# Patient Record
Sex: FEMALE | Race: OTHER | Employment: UNEMPLOYED | URBAN - METROPOLITAN AREA
[De-identification: names, ages, dates, MRNs, and addresses within clinical notes are randomized per-mention and may not be internally consistent; named-entity substitution may affect disease eponyms.]

---

## 2017-12-21 ENCOUNTER — OFFICE VISIT (OUTPATIENT)
Dept: OBGYN | Age: 57
End: 2017-12-21

## 2017-12-21 VITALS
BODY MASS INDEX: 25.27 KG/M2 | WEIGHT: 148 LBS | HEIGHT: 64 IN | DIASTOLIC BLOOD PRESSURE: 78 MMHG | SYSTOLIC BLOOD PRESSURE: 126 MMHG

## 2017-12-21 DIAGNOSIS — Z12.31 SCREENING MAMMOGRAM, ENCOUNTER FOR: ICD-10-CM

## 2017-12-21 DIAGNOSIS — Z01.419 WOMEN'S ANNUAL ROUTINE GYNECOLOGICAL EXAMINATION: Primary | ICD-10-CM

## 2017-12-21 DIAGNOSIS — Z11.51 SPECIAL SCREENING EXAMINATION FOR HUMAN PAPILLOMAVIRUS (HPV): ICD-10-CM

## 2017-12-21 PROCEDURE — 99396 PREV VISIT EST AGE 40-64: CPT | Performed by: OBSTETRICS & GYNECOLOGY

## 2017-12-21 RX ORDER — FAMOTIDINE 20 MG/1
TABLET, FILM COATED ORAL
Refills: 1 | COMMUNITY
Start: 2017-12-15

## 2017-12-21 RX ORDER — RIZATRIPTAN BENZOATE 10 MG/1
10 TABLET ORAL
COMMUNITY
End: 2019-01-08 | Stop reason: ALTCHOICE

## 2017-12-21 ASSESSMENT — ENCOUNTER SYMPTOMS
BLOOD IN STOOL: 0
ALLERGIC/IMMUNOLOGIC NEGATIVE: 1
CONSTIPATION: 0
NAUSEA: 0
EYES NEGATIVE: 1
ANAL BLEEDING: 0
ABDOMINAL PAIN: 0
RECTAL PAIN: 0
DIARRHEA: 0
ABDOMINAL DISTENTION: 0
VOMITING: 0
RESPIRATORY NEGATIVE: 1

## 2017-12-22 LAB — PAP SMEAR: NORMAL

## 2017-12-22 NOTE — PROGRESS NOTES
Subjective:      Patient ID: Isiah Melendez is a 61 y.o. female    Annual exam.  No PMB. No GYN complaints. Pap performed and screening mammogram ordered. Monthly SBE encouraged. Screening colonoscopy recommended per routine. F/U annual exam or prn. Vitals:  /78   Ht 5' 4\" (1.626 m)   Wt 148 lb (67.1 kg)   BMI 25.40 kg/m²   Past Medical History:   Diagnosis Date    IBS (irritable bowel syndrome)     Migraines      Past Surgical History:   Procedure Laterality Date    BUNIONECTOMY      NASAL SEPTUM SURGERY      TUBAL LIGATION      VARICOSE VEIN SURGERY       Allergies: Iodides; Nsaids; and Adhesive tape  Current Outpatient Prescriptions   Medication Sig Dispense Refill    famotidine (PEPCID) 20 MG tablet   1    rizatriptan (MAXALT) 10 MG tablet Take 10 mg by mouth once as needed for Migraine May repeat in 2 hours if needed       No current facility-administered medications for this visit. Social History     Social History    Marital status:      Spouse name: N/A    Number of children: N/A    Years of education: N/A     Occupational History    Not on file.      Social History Main Topics    Smoking status: Never Smoker    Smokeless tobacco: Never Used    Alcohol use No    Drug use: No    Sexual activity: Yes     Partners: Male     Other Topics Concern    Not on file     Social History Narrative    No narrative on file     Family History   Problem Relation Age of Onset    Alzheimer's Disease Father     Hypertension Mother     Diabetes Mother     Cancer Mother     No Known Problems Paternal Grandfather     No Known Problems Paternal Grandmother     No Known Problems Maternal Grandmother     No Known Problems Maternal Grandfather     No Known Problems Brother     No Known Problems Sister     No Known Problems Other     Breast Cancer Neg Hx     Colon Cancer Neg Hx     Eclampsia Neg Hx     Ovarian Cancer Neg Hx      Labor Neg Hx     Spont Abortions Neg Hx     Stroke Neg Hx        Review of Systems   Constitutional: Negative. Negative for activity change, appetite change, chills, diaphoresis, fatigue, fever and unexpected weight change. HENT: Negative. Eyes: Negative. Respiratory: Negative. Cardiovascular: Negative. Gastrointestinal: Negative for abdominal distention, abdominal pain, anal bleeding, blood in stool, constipation, diarrhea, nausea, rectal pain and vomiting. Endocrine: Negative. Genitourinary: Negative for decreased urine volume, difficulty urinating, dyspareunia, dysuria, enuresis, flank pain, frequency, genital sores, hematuria, menstrual problem, pelvic pain, urgency, vaginal bleeding, vaginal discharge and vaginal pain. Musculoskeletal: Negative. Skin: Negative. Allergic/Immunologic: Negative. Neurological: Negative. Hematological: Negative. Psychiatric/Behavioral: Negative. Objective:     Physical Exam   Constitutional: She is oriented to person, place, and time. She appears well-developed and well-nourished. HENT:   Head: Normocephalic. Neck: Normal range of motion. Neck supple. No thyromegaly present. Cardiovascular: Normal rate, regular rhythm and normal heart sounds. Pulmonary/Chest: Effort normal and breath sounds normal. No respiratory distress. She has no wheezes. She has no rales. She exhibits no tenderness. Abdominal: Soft. Bowel sounds are normal. She exhibits no distension and no mass. There is no tenderness. There is no rebound and no guarding. Hernia confirmed negative in the right inguinal area and confirmed negative in the left inguinal area. Genitourinary: Rectum normal, vagina normal and uterus normal. No breast swelling, tenderness, discharge or bleeding. No labial fusion. There is no rash, tenderness, lesion or injury on the right labia. There is no rash, tenderness, lesion or injury on the left labia. Uterus is not deviated, not enlarged, not fixed and not tender. Cervix exhibits no motion tenderness, no discharge and no friability. Right adnexum displays no mass, no tenderness and no fullness. Left adnexum displays no mass, no tenderness and no fullness. No erythema, tenderness or bleeding in the vagina. No foreign body in the vagina. No signs of injury around the vagina. No vaginal discharge found. Musculoskeletal: Normal range of motion. She exhibits no edema or tenderness. Lymphadenopathy:     She has no cervical adenopathy. Right: No inguinal adenopathy present. Left: No inguinal adenopathy present. Neurological: She is alert and oriented to person, place, and time. Skin: Skin is warm and dry. No rash noted. No erythema. No pallor. Psychiatric: She has a normal mood and affect. Her behavior is normal. Judgment and thought content normal.       Assessment:      1. Women's annual routine gynecological examination  PAP SMEAR   2. Special screening examination for human papillomavirus (HPV)  PAP SMEAR   3. Screening mammogram, encounter for  LOGAN DIGITAL SCREEN W OR WO CAD BILATERAL         Plan:      Medications placed this encounter:  No orders of the defined types were placed in this encounter. Orders placed this encounter:  Orders Placed This Encounter   Procedures    LOGAN DIGITAL SCREEN W OR WO CAD BILATERAL     Standing Status:   Future     Standing Expiration Date:   2/21/2019    PAP SMEAR     Standing Status:   Future     Standing Expiration Date:   12/21/2018     Order Specific Question:   Collection Type     Answer:   SurePath     Order Specific Question:   Prior Abnormal Pap Test     Answer:   No     Order Specific Question:   Screening or Diagnostic     Answer:   Screening     Order Specific Question:   HPV Requested?      Answer:   HPV 16/18     Order Specific Question:   High Risk Patient     Answer:   N/A         Follow up:  Return in about 1 year (around 12/21/2018) for Annual.

## 2018-01-03 DIAGNOSIS — Z11.51 SPECIAL SCREENING EXAMINATION FOR HUMAN PAPILLOMAVIRUS (HPV): ICD-10-CM

## 2018-01-03 DIAGNOSIS — Z01.419 WOMEN'S ANNUAL ROUTINE GYNECOLOGICAL EXAMINATION: ICD-10-CM

## 2018-01-03 NOTE — LETTER
[place] [id number] [facility] [address] [address]  Phone: [phone]  Fax: [phone]    Ted Holt MD        January 4, 2018    [address] [address]      Dear Sandra Boucher:    The results of your most recent Pap smear are normal. This means that no cancerous or precancerous cells were seen. We recommend that you come back in 1 year for your next routine Pap smear. If you have any questions or concerns, please don't hesitate to call.     Sincerely,        Ted Holt MD

## 2018-04-05 ENCOUNTER — HOSPITAL ENCOUNTER (OUTPATIENT)
Dept: ULTRASOUND IMAGING | Age: 58
Discharge: HOME | End: 2018-04-07
Payer: COMMERCIAL

## 2018-04-05 DIAGNOSIS — K85.00 IDIOPATHIC ACUTE PANCREATITIS, UNSPECIFIED COMPLICATION STATUS: ICD-10-CM

## 2018-04-05 PROCEDURE — 76705 ECHO EXAM OF ABDOMEN: CPT

## 2019-01-08 ENCOUNTER — OFFICE VISIT (OUTPATIENT)
Dept: OBGYN CLINIC | Age: 59
End: 2019-01-08
Payer: COMMERCIAL

## 2019-01-08 VITALS
HEIGHT: 64 IN | DIASTOLIC BLOOD PRESSURE: 72 MMHG | SYSTOLIC BLOOD PRESSURE: 110 MMHG | WEIGHT: 156 LBS | BODY MASS INDEX: 26.63 KG/M2

## 2019-01-08 DIAGNOSIS — Z01.419 ENCOUNTER FOR WELL WOMAN EXAM: Primary | ICD-10-CM

## 2019-01-08 DIAGNOSIS — Z12.31 SCREENING MAMMOGRAM, ENCOUNTER FOR: ICD-10-CM

## 2019-01-08 DIAGNOSIS — Z78.0 POSTMENOPAUSAL: ICD-10-CM

## 2019-01-08 PROCEDURE — 99396 PREV VISIT EST AGE 40-64: CPT | Performed by: OBSTETRICS & GYNECOLOGY

## 2019-01-08 PROCEDURE — G8484 FLU IMMUNIZE NO ADMIN: HCPCS | Performed by: OBSTETRICS & GYNECOLOGY

## 2019-01-08 RX ORDER — VITAMIN B COMPLEX
1 CAPSULE ORAL DAILY
COMMUNITY
End: 2020-06-11

## 2019-01-08 RX ORDER — ELETRIPTAN HYDROBROMIDE 40 MG/1
40 TABLET, FILM COATED ORAL
COMMUNITY

## 2019-01-08 RX ORDER — MAGNESIUM 30 MG
30 TABLET ORAL 2 TIMES DAILY
COMMUNITY

## 2019-01-08 RX ORDER — ATENOLOL 50 MG/1
TABLET ORAL
Refills: 0 | COMMUNITY
Start: 2018-10-26

## 2019-01-08 ASSESSMENT — ENCOUNTER SYMPTOMS
RESPIRATORY NEGATIVE: 1
ABDOMINAL PAIN: 0
ABDOMINAL DISTENTION: 0
ANAL BLEEDING: 0
RECTAL PAIN: 0
CONSTIPATION: 0
VOMITING: 0
DIARRHEA: 0
NAUSEA: 0
BLOOD IN STOOL: 0
ALLERGIC/IMMUNOLOGIC NEGATIVE: 1
EYES NEGATIVE: 1

## 2019-01-08 ASSESSMENT — PATIENT HEALTH QUESTIONNAIRE - PHQ9
SUM OF ALL RESPONSES TO PHQ QUESTIONS 1-9: 0
2. FEELING DOWN, DEPRESSED OR HOPELESS: 0
SUM OF ALL RESPONSES TO PHQ QUESTIONS 1-9: 0
SUM OF ALL RESPONSES TO PHQ9 QUESTIONS 1 & 2: 0
1. LITTLE INTEREST OR PLEASURE IN DOING THINGS: 0

## 2019-11-13 ENCOUNTER — OFFICE VISIT (OUTPATIENT)
Dept: FAMILY MEDICINE CLINIC | Age: 59
End: 2019-11-13
Payer: COMMERCIAL

## 2019-11-13 VITALS
WEIGHT: 149.8 LBS | BODY MASS INDEX: 25.57 KG/M2 | OXYGEN SATURATION: 98 % | HEART RATE: 68 BPM | HEIGHT: 64 IN | RESPIRATION RATE: 16 BRPM | SYSTOLIC BLOOD PRESSURE: 126 MMHG | DIASTOLIC BLOOD PRESSURE: 68 MMHG | TEMPERATURE: 98.9 F

## 2019-11-13 DIAGNOSIS — N61.1 BREAST ABSCESS OF FEMALE: Primary | ICD-10-CM

## 2019-11-13 PROCEDURE — 99203 OFFICE O/P NEW LOW 30 MIN: CPT | Performed by: NURSE PRACTITIONER

## 2019-11-13 RX ORDER — PREDNISOLONE SODIUM PHOSPHATE 15 MG/5ML
60 SOLUTION ORAL
COMMUNITY
Start: 2017-04-03 | End: 2021-06-15

## 2019-11-13 RX ORDER — RIZATRIPTAN BENZOATE 10 MG/1
TABLET ORAL
COMMUNITY

## 2019-11-13 RX ORDER — LORATADINE 10 MG/1
10 TABLET ORAL
COMMUNITY
End: 2021-06-15

## 2019-11-13 RX ORDER — SULFAMETHOXAZOLE AND TRIMETHOPRIM 800; 160 MG/1; MG/1
1 TABLET ORAL 2 TIMES DAILY
Qty: 14 TABLET | Refills: 0 | Status: SHIPPED | OUTPATIENT
Start: 2019-11-13 | End: 2019-11-20

## 2019-11-13 RX ORDER — METHOCARBAMOL 750 MG/1
TABLET, FILM COATED ORAL
Refills: 0 | COMMUNITY
Start: 2019-10-31 | End: 2020-06-11

## 2019-11-28 PROBLEM — N61.1 BREAST ABSCESS OF FEMALE: Status: ACTIVE | Noted: 2019-11-28

## 2019-11-28 PROBLEM — I10 ESSENTIAL HYPERTENSION: Status: ACTIVE | Noted: 2019-11-28

## 2020-06-11 ENCOUNTER — OFFICE VISIT (OUTPATIENT)
Dept: OBGYN CLINIC | Age: 60
End: 2020-06-11
Payer: COMMERCIAL

## 2020-06-11 VITALS
SYSTOLIC BLOOD PRESSURE: 110 MMHG | BODY MASS INDEX: 26.98 KG/M2 | DIASTOLIC BLOOD PRESSURE: 70 MMHG | WEIGHT: 158 LBS | HEIGHT: 64 IN

## 2020-06-11 PROCEDURE — G8419 CALC BMI OUT NRM PARAM NOF/U: HCPCS | Performed by: OBSTETRICS & GYNECOLOGY

## 2020-06-11 PROCEDURE — 99396 PREV VISIT EST AGE 40-64: CPT | Performed by: OBSTETRICS & GYNECOLOGY

## 2020-06-11 PROCEDURE — 3017F COLORECTAL CA SCREEN DOC REV: CPT | Performed by: OBSTETRICS & GYNECOLOGY

## 2020-06-11 PROCEDURE — 1036F TOBACCO NON-USER: CPT | Performed by: OBSTETRICS & GYNECOLOGY

## 2020-06-11 PROCEDURE — G8427 DOCREV CUR MEDS BY ELIG CLIN: HCPCS | Performed by: OBSTETRICS & GYNECOLOGY

## 2020-06-11 PROCEDURE — 99213 OFFICE O/P EST LOW 20 MIN: CPT | Performed by: OBSTETRICS & GYNECOLOGY

## 2020-06-11 RX ORDER — CLOBETASOL PROPIONATE 0.5 MG/G
0.5 OINTMENT TOPICAL
Qty: 30 G | Refills: 2 | Status: SHIPPED | OUTPATIENT
Start: 2020-06-11 | End: 2021-06-15 | Stop reason: SDUPTHER

## 2020-06-11 ASSESSMENT — ENCOUNTER SYMPTOMS
RECTAL PAIN: 0
ABDOMINAL PAIN: 0
ANAL BLEEDING: 0
NAUSEA: 0
ALLERGIC/IMMUNOLOGIC NEGATIVE: 1
ABDOMINAL DISTENTION: 0
RESPIRATORY NEGATIVE: 1
CONSTIPATION: 0
VOMITING: 0
BLOOD IN STOOL: 0
EYES NEGATIVE: 1
DIARRHEA: 0

## 2020-06-11 NOTE — PROGRESS NOTES
Eclampsia Neg Hx     Ovarian Cancer Neg Hx      Labor Neg Hx     Spont Abortions Neg Hx     Stroke Neg Hx        Review of Systems   Constitutional: Negative. Negative for activity change, appetite change, chills, diaphoresis, fatigue, fever and unexpected weight change. HENT: Negative. Eyes: Negative. Respiratory: Negative. Cardiovascular: Negative. Gastrointestinal: Negative for abdominal distention, abdominal pain, anal bleeding, blood in stool, constipation, diarrhea, nausea, rectal pain and vomiting. Endocrine: Negative. Genitourinary: Negative for decreased urine volume, difficulty urinating, dyspareunia, dysuria, enuresis, flank pain, frequency, genital sores, hematuria, menstrual problem, pelvic pain, urgency, vaginal bleeding, vaginal discharge and vaginal pain. Musculoskeletal: Negative. Skin: Negative. Allergic/Immunologic: Negative. Neurological: Negative. Hematological: Negative. Psychiatric/Behavioral: Negative. Objective:     Physical Exam  Constitutional:       Appearance: She is well-developed. HENT:      Head: Normocephalic. Neck:      Musculoskeletal: Normal range of motion and neck supple. Thyroid: No thyromegaly. Cardiovascular:      Rate and Rhythm: Normal rate and regular rhythm. Heart sounds: Normal heart sounds. Pulmonary:      Effort: Pulmonary effort is normal. No respiratory distress. Breath sounds: Normal breath sounds. No wheezing or rales. Chest:      Chest wall: No tenderness. Breasts:         Right: No mass, nipple discharge, skin change or tenderness. Left: No mass, nipple discharge, skin change or tenderness. Abdominal:      General: Bowel sounds are normal. There is no distension. Palpations: Abdomen is soft. There is no mass. Tenderness: There is no abdominal tenderness. There is no guarding or rebound.       Hernia: There is no hernia in the right inguinal area or left

## 2020-06-12 DIAGNOSIS — Z01.419 ENCOUNTER FOR ANNUAL ROUTINE GYNECOLOGICAL EXAMINATION: ICD-10-CM

## 2020-06-12 DIAGNOSIS — Z11.51 SCREENING FOR HUMAN PAPILLOMAVIRUS: ICD-10-CM

## 2020-06-16 ENCOUNTER — HOSPITAL ENCOUNTER (OUTPATIENT)
Dept: WOMENS IMAGING | Age: 60
Discharge: HOME | End: 2020-06-18
Payer: COMMERCIAL

## 2020-06-16 PROCEDURE — 77080 DXA BONE DENSITY AXIAL: CPT

## 2020-06-17 LAB
HPV COMMENT: NORMAL
HPV TYPE 16: NOT DETECTED
HPV TYPE 18: NOT DETECTED
HPVOH (OTHER TYPES): NOT DETECTED

## 2020-07-14 ENCOUNTER — TELEPHONE (OUTPATIENT)
Dept: OBGYN CLINIC | Age: 60
End: 2020-07-14

## 2020-07-14 PROBLEM — M47.816 LUMBAR SPONDYLOSIS: Status: ACTIVE | Noted: 2020-07-14

## 2020-07-14 PROBLEM — M46.1 SACROILIITIS (HCC): Status: ACTIVE | Noted: 2020-07-14

## 2020-07-14 PROBLEM — Z98.890 HISTORY OF FOOT SURGERY: Status: ACTIVE | Noted: 2020-07-14

## 2020-07-17 ENCOUNTER — INITIAL CONSULT (OUTPATIENT)
Dept: PODIATRY | Facility: CLINIC | Age: 60
End: 2020-07-17

## 2020-07-17 VITALS — TEMPERATURE: 97.1 F | WEIGHT: 150 LBS | BODY MASS INDEX: 25.61 KG/M2 | HEIGHT: 64 IN

## 2020-07-17 ASSESSMENT — ENCOUNTER SYMPTOMS
NAUSEA: 0
BACK PAIN: 1
CONSTIPATION: 0
DIARRHEA: 0
SHORTNESS OF BREATH: 0

## 2020-07-17 NOTE — PATIENT INSTRUCTIONS
Patient Education        Achilles Tendon: Exercises  Introduction  Here are some examples of exercises for you to try. The exercises may be suggested for a condition or for rehabilitation. Start each exercise slowly. Ease off the exercises if you start to have pain. You will be told when to start these exercises and which ones will work best for you. Toe stretch  Toe stretch   1. Sit in a chair, and extend your affected leg so that your heel is on the floor. 2. With your hand, reach down and pull your big toe up and back. Pull toward your ankle and away from the floor. 3. Hold the position for at least 15 to 30 seconds. 4. Repeat 2 to 4 times a session, several times a day. Calf-plantar fascia stretch   1. Sit with your legs extended and knees straight. 2. Place a towel around your foot just under the toes. 3. Hold each end of the towel in each hand, with your hands above your knees. 4. Pull back with the towel so that your foot stretches toward you. 5. Hold the position for at least 15 to 30 seconds. 6. Repeat 2 to 4 times a session, up to 5 sessions a day. Floor stretch   1. Stand about 2 feet from a wall, and place your hands on the wall at about shoulder height. Or you can stand behind a chair, placing your hands on the back of it for balance. 2. Step back with the leg you want to stretch. Keep the leg straight, and press your heel into the floor with your toe turned slightly in.  3. Lean forward, and bend your other leg slightly. Feel the stretch in the Achilles tendon of your back leg. Hold for at least 15 to 30 seconds. 4. Repeat 2 to 4 times a session, up to 5 sessions a day. Stair stretch   1. Stand with the balls of both feet on the edge of a step or curb (or a medium-sized phone book). With at least one hand, hold onto something solid for balance, such as a banister or handrail.   2. Keeping your affected leg straight, slowly let that heel hang down off of the step or curb until you feel a stretch in the back of your calf and/or Achilles area. Some of your weight should still be on the other leg. 3. Hold this position for at least 15 to 30 seconds. 4. Repeat 2 to 4 times a session, up to 5 times a day or whenever your Achilles tendon starts to feel tight. This stretch can also be done with your knee slightly bent. Strength exercise   1. This exercise will get you started on building strength after an Achilles tendon injury. Your doctor or physical therapist can help you move on to more challenging exercises as you heal and get stronger. 2. Stand on a step with your heel off the edge of the step. Hold on to a handrail or wall for balance. 3. Push up on your toes, then slowly count to 10 as you lower yourself back down until your heel is below the step. If it hurts to push up on your toes, try putting most of your weight on your other foot as you push up, or try using your arms to help you. If you can't do this exercise without causing pain, stop the exercise and talk to your doctor. 4. Repeat the exercise 8 to 12 times, half with the knee straight and half with the knee bent. Follow-up care is a key part of your treatment and safety. Be sure to make and go to all appointments, and call your doctor if you are having problems. It's also a good idea to know your test results and keep a list of the medicines you take. Where can you learn more? Go to https://[employer] org and sign in to your [employer] account. Enter S268 in the [facility] box to learn more about \"Achilles Tendon: Exercises. \"     If you do not have an account, please click on the \"Sign Up Now\" link. Current as of: March 2, 2020               Content Version: 12.5  © 9607-1772 Healthwise, Inc[employer]. Care instructions adapted under license by [facility] [employer] [facility]  If you have questions about a medical condition or this instruction, always ask your healthcare professional. Anibal Arellano

## 2020-07-17 NOTE — PROGRESS NOTES
Hadley Edmonds  (1960)    7/17/20    Subjective     Hadley Edmonds is 63 y.o. female who complains today of:    Chief Complaint   Patient presents with    Foot Pain      Left Heel      Hadley Edmonds is seen in consultation at the request of Dr. Anson Wilson for evaluation of left foot pain. HPI: The patient complains of left foot pain for the past 5 years. Patient describes the pain as achy, shooting, numb, tingling, and burning. She rates the pain at 6/10 at its worst.  Patient does not recall trauma. Patient points to the ball of her foot at the base of the second and third toes and the posterior heel is the most painful areas. Patient states that she had a bunion procedure performed in 2013 to address this pain, it was not effective. Patient states she was diagnosed with a Neal's neuroma, an injection was performed but it offered no relief. Patient states an ultrasound performed at that time, she states there was a collection of fluid in the area. She denies having x-rays performed recently. Review of Systems   Constitutional: Negative for fever. HENT: Negative for hearing loss. Respiratory: Negative for shortness of breath. Gastrointestinal: Negative for constipation, diarrhea and nausea. Genitourinary: Negative for difficulty urinating. Musculoskeletal: Positive for back pain. Negative for neck pain. Skin: Negative for rash. Neurological: Positive for headaches. Hematological: Does not bruise/bleed easily. Psychiatric/Behavioral: Negative for sleep disturbance. She has not tried physical therapy. Date of last of last PT treatment: none    The patient is not a diabetic. Allergies: Iodides; Nsaids; Adhesive tape; and Other    Current Outpatient Medications on File Prior to Visit   Medication Sig Dispense Refill    clobetasol (TEMOVATE) 0.05 % ointment Apply 0.5 each topically Twice a Week Apply topically 2 times daily.  30 g 2    rizatriptan (MAXALT) 10 MG tablet       Polyethylene Glycol 3350 (MIRALAX PO)       loratadine (CLARITIN) 10 MG tablet Take 10 mg by mouth      prednisoLONE (ORAPRED) 15 MG/5ML solution Take 60 mg by mouth      Riboflavin 100 MG TABS Take by mouth      atenolol (TENORMIN) 50 MG tablet take 1 tablet by mouth once daily  0    magnesium 30 MG tablet Take 30 mg by mouth 2 times daily      Coenzyme Q10 (CO Q-10 MAXIMUM STRENGTH PO) Take by mouth      eletriptan (RELPAX) 40 MG tablet Take 40 mg by mouth once as needed may repeat in 2 hours if necessary      famotidine (PEPCID) 20 MG tablet   1     No current facility-administered medications on file prior to visit.         Past Medical History:   Diagnosis Date    Breast abscess of female 11/28/2019    Essential hypertension 11/28/2019    IBS (irritable bowel syndrome)     Migraines     PVD (posterior vitreous detachment), right      Past Surgical History:   Procedure Laterality Date   [facility] EYE SURGERY  11/2018    NASAL SEPTUM SURGERY      TUBAL LIGATION      VARICOSE VEIN SURGERY       Social History     Socioeconomic History    Marital status:      Spouse name: Not on file    Number of children: Not on file    Years of education: Not on file    Highest education level: Not on file   Occupational History    Not on file   Social Needs    Financial resource strain: Not on file    Food insecurity     Worry: Not on file     Inability: Not on file    Transportation needs     Medical: Not on file     Non-medical: Not on file   Tobacco Use    Smoking status: Never Smoker    Smokeless tobacco: Never Used   Substance and Sexual Activity    Alcohol use: No     Alcohol/week: 0.0 standard drinks    Drug use: No    Sexual activity: Yes     Partners: Male   Lifestyle    Physical activity     Days per week: Not on file     Minutes per session: Not on file    Stress: Not on file   Relationships    Social connections     Talks on phone: Not on file     Gets bilaterally. Vibratory sensation is intact bilaterally. Hot versus cold discrimination is intact bilaterally. Sharp versus dull discrimination is intact bilaterally. Patellar deep tendon reflex is graded at 2/4 bilaterally. Achilles tendon deep tendon reflex is graded at 2/4 bilaterally. The Babinski response is negative bilaterally. No ankle clonus is noted bilaterally. The patient is able to abduct the bilateral fifth toe on command. There is pain and recreation of their symptoms without a palpable click with Marielos`s test and palpation of the second interspace left foot. Dermatological:  No open lesions noted bilateral foot. Surgical cicatrices noted to the bilateral first ray. The toenails are incurvated and are well groomed bilaterally. Normal skin texture noted bilaterally. Focal hyperkeratotic lesions noted: none. Normal skin turgor noted bilaterally. Webspace 1-4 is dry and intact bilateral foot. Erythema noted overlying the bilateral 5th MPJ. Musculoskeletal:  Rectus foot type noted bilaterally. Manual muscle strength is graded at 5/5 for all groups tested bilateral foot. Gross static deformities noted: tailor's bunion deformity with adductovarus fifth digit noted bilaterally. Pain or crepitus noted with active or passive range of motion of the joints of the foot or ankle: none. Decreased ankle joint dorsiflexion noted bilateral lower extremity. There is tenderness palpation of the Achilles tendon insertion at the medial/posterior aspect of the left heel, no palpable dell or appreciable rupture noted. No tenderness to palpation noted to the left second or third MPJ. Psychiatric:    Judgement and insight intact. Short and long term memory intact. No evidence of depression, anxiety, or agitation. Patient is calm, cooperative, and communicative. Appropriate interactions and affect. Assessment:      Diagnosis Orders   1.  Left foot pain  XR Foot Left Ap Lateral and Oblique Standing    WI STRAPPING; ANKLE &/OR FOOT   2. Lesion of left plantar nerve  WI STRAPPING; ANKLE &/OR FOOT   3. Tendonitis, Achilles, left         Plan:     Left foot neuroma: The etiology of the condition was discussed with the patient along with the various conservative treatment options available including steroid injection, sclerosing alcohol injection, and orthotic therapy. If these measures fail to relieve the symptoms surgical intervention may be warranted. A strapping and pad was constructed for the symptomatic area. The patient is to ice to the affected area for 10-15 minutess and perform stretching exercises daily. Wider shoe gear is also reccommended. X-rays of the left foot were ordered. Left Achilles tendonitis:  A home exercise plan was dispensed to the patient, she is instructed to apply heat to the calf muscle prior to stretching and to ice after. Orders Placed This Encounter   Procedures    XR Foot Left Ap Lateral and Oblique Standing     Standing Status:   Future     Standing Expiration Date:   7/17/2021     Order Specific Question:   Reason for exam:     Answer:   heel pain, forefoot pain    WI STRAPPING; ANKLE &/OR FOOT     All questions were answered to the patient's satisfaction. Thank you for allowing me to participate in the care of your patient. Follow up:  Return in about 1 week (around 7/24/2020). Linda Greer DPM      Please note that this report has been partially produced using speech recognition software which may cause errors including grammar, punctuation, and spelling or words and phrases that may seem inappropriate. If there are questions or concerns please feel free to contact me for clarification.

## 2020-07-27 ENCOUNTER — OFFICE VISIT (OUTPATIENT)
Dept: PODIATRY | Facility: CLINIC | Age: 60
End: 2020-07-27

## 2020-07-27 VITALS — TEMPERATURE: 97.7 F | HEIGHT: 64 IN | WEIGHT: 150 LBS | BODY MASS INDEX: 25.61 KG/M2

## 2020-07-27 ASSESSMENT — ENCOUNTER SYMPTOMS
BACK PAIN: 1
SHORTNESS OF BREATH: 0
CONSTIPATION: 0
NAUSEA: 0
DIARRHEA: 0

## 2020-07-27 NOTE — PROGRESS NOTES
Belvin Apley  (1960)    7/17/20    Subjective     Belvin Apley is 63 y.o. female who complains today of:    Chief Complaint   Patient presents with    Foot Pain     Left     Foot Pain    Pertinent negatives include no fever. HPI: The patient presents for follow up for left foot pain. She reports decreased pain when wearing the foot strapping. She reports compliance with home exercises. She states that she had x-rays of the left foot taken. Review of Systems   Constitutional: Negative for fever. HENT: Negative for hearing loss. Respiratory: Negative for shortness of breath. Gastrointestinal: Negative for constipation, diarrhea and nausea. Genitourinary: Negative for difficulty urinating. Musculoskeletal: Positive for back pain. Negative for neck pain. Skin: Negative for rash. Neurological: Positive for headaches. Hematological: Does not bruise/bleed easily. Psychiatric/Behavioral: Negative for sleep disturbance. She has not tried physical therapy. Date of last of last PT treatment: none    The patient is not a diabetic. Allergies: Iodides; Nsaids; Adhesive tape; and Other    Current Outpatient Medications on File Prior to Visit   Medication Sig Dispense Refill    clobetasol (TEMOVATE) 0.05 % ointment Apply 0.5 each topically Twice a Week Apply topically 2 times daily.  30 g 2    rizatriptan (MAXALT) 10 MG tablet       Polyethylene Glycol 3350 (MIRALAX PO)       loratadine (CLARITIN) 10 MG tablet Take 10 mg by mouth      prednisoLONE (ORAPRED) 15 MG/5ML solution Take 60 mg by mouth      Riboflavin 100 MG TABS Take by mouth      atenolol (TENORMIN) 50 MG tablet take 1 tablet by mouth once daily  0    magnesium 30 MG tablet Take 30 mg by mouth 2 times daily      Coenzyme Q10 (CO Q-10 MAXIMUM STRENGTH PO) Take by mouth      eletriptan (RELPAX) 40 MG tablet Take 40 mg by mouth once as needed may repeat in 2 hours if necessary      famotidine (PEPCID) 20 Father     Hypertension Mother     Diabetes Mother     Cancer Mother     No Known Problems Paternal Grandfather     No Known Problems Paternal Grandmother     No Known Problems Maternal Grandmother     No Known Problems Maternal Grandfather     No Known Problems Brother     No Known Problems Sister     No Known Problems Other     Breast Cancer Neg Hx     Colon Cancer Neg Hx     Eclampsia Neg Hx     Ovarian Cancer Neg Hx      Labor Neg Hx     Spont Abortions Neg Hx     Stroke Neg Hx            Objective:   Vitals:  Temp 97.7 °F (36.5 °C)   Ht 5' 4\" (1.626 m)   Wt 150 lb (68 kg)   BMI 25.75 kg/m² Pain Score:   4    Physical Exam  Vascular:   Dorsalis pedis pulse is palpable bilateral foot. Posterior tibial pulse is palpable bilateral foot. There is no edema noted to the bilateral lower extremity. Capillary refill is immediate bilateral hallux. There are mild varicosities noted bilaterally. There are mild telangiectasia noted bilaterally. Skin temperature gradient is noted to be warm to warm bilaterally. There are no signs of ischemia or skin atrophy noted bilaterally. Hair growth is present bilaterally. Neurological:   Light touch and protective sensation is intact bilaterally. Vibratory sensation is intact bilaterally. Hot versus cold discrimination is intact bilaterally. Sharp versus dull discrimination is intact bilaterally. Patellar deep tendon reflex is graded at 2/4 bilaterally. Achilles tendon deep tendon reflex is graded at 2/4 bilaterally. The Babinski response is negative bilaterally. No ankle clonus is noted bilaterally. The patient is able to abduct the bilateral fifth toe on command. There is pain and recreation of their symptoms without a palpable click with Marielos`s test and palpation of the second interspace left foot. Dermatological:  No open lesions noted bilateral foot. Surgical cicatrices noted to the bilateral first ray.   The toenails are incurvated and are well groomed bilaterally. Normal skin texture noted bilaterally. No focal hyperkeratotic lesions noted. Normal skin turgor noted bilaterally. Webspace 1-4 is dry and intact bilateral foot. Erythema noted overlying the bilateral 5th MPJ. Musculoskeletal:  Rectus foot type noted bilaterally. Manual muscle strength is graded at 5/5 for all groups tested bilateral foot. Gross static deformities noted: tailor's bunion deformity with adductovarus fifth digit noted bilaterally. Pain or crepitus noted with active or passive range of motion of the joints of the foot or ankle: none. Decreased ankle joint dorsiflexion noted bilateral lower extremity. There is tenderness palpation of the Achilles tendon insertion at the medial/posterior aspect of the left heel, no palpable dell or appreciable rupture noted. No tenderness to palpation noted to the left second or third metatarsal heads. Radiographs:                    Assessment:      Diagnosis Orders   1. Left foot pain  US EXTREMITY JOINT LEFT NON VASC COMPLETE   2. Neal's neuroma of second interspace of left foot  US EXTREMITY JOINT LEFT NON VASC COMPLETE   3. Tendonitis, Achilles, left  US EXTREMITY JOINT LEFT NON VASC COMPLETE   4. Hammer toe of left foot         Plan:     Left foot x-rays reviewed. I have ordered a diagnostic ultrasound was ordered. Patient instructed to continue foot strapping and home exercise plan.     I have recommended a toe juan toe separator and a topical pain cream.    Orders Placed This Encounter   Procedures    US EXTREMITY JOINT LEFT NON VASC COMPLETE     Standing Status:   Future     Standing Expiration Date:   7/27/2021     Scheduling Instructions:      Include symptoms in field above \"REASON FOR EXAM\"      Order: US Complete Left Ankle/Foot     Order Specific Question:   Reason for exam:     Answer:   pain 2nd interspace and Achiiles insertion, also check plantar plates 2 & 3     Follow up:  Return in about 3 weeks (around 8/17/2020). John Wallis DPM      Please note that this report has been partially produced using speech recognition software which may cause errors including grammar, punctuation, and spelling or words and phrases that may seem inappropriate. If there are questions or concerns please feel free to contact me for clarification.

## 2020-08-10 ENCOUNTER — OFFICE VISIT (OUTPATIENT)
Dept: OBGYN CLINIC | Age: 60
End: 2020-08-10
Payer: COMMERCIAL

## 2020-08-10 VITALS — BODY MASS INDEX: 27.81 KG/M2 | DIASTOLIC BLOOD PRESSURE: 78 MMHG | SYSTOLIC BLOOD PRESSURE: 132 MMHG | WEIGHT: 162 LBS

## 2020-08-10 PROCEDURE — 99213 OFFICE O/P EST LOW 20 MIN: CPT | Performed by: OBSTETRICS & GYNECOLOGY

## 2020-08-10 PROCEDURE — 1036F TOBACCO NON-USER: CPT | Performed by: OBSTETRICS & GYNECOLOGY

## 2020-08-10 PROCEDURE — G8419 CALC BMI OUT NRM PARAM NOF/U: HCPCS | Performed by: OBSTETRICS & GYNECOLOGY

## 2020-08-10 PROCEDURE — 3017F COLORECTAL CA SCREEN DOC REV: CPT | Performed by: OBSTETRICS & GYNECOLOGY

## 2020-08-10 PROCEDURE — G8427 DOCREV CUR MEDS BY ELIG CLIN: HCPCS | Performed by: OBSTETRICS & GYNECOLOGY

## 2020-08-10 RX ORDER — IBANDRONATE SODIUM 150 MG/1
150 TABLET, FILM COATED ORAL
Qty: 30 TABLET | Refills: 3 | Status: SHIPPED | OUTPATIENT
Start: 2020-08-10 | End: 2021-08-10

## 2020-08-10 ASSESSMENT — ENCOUNTER SYMPTOMS
RECTAL PAIN: 0
CONSTIPATION: 0
ABDOMINAL DISTENTION: 0
NAUSEA: 0
EYES NEGATIVE: 1
BLOOD IN STOOL: 0
DIARRHEA: 0
RESPIRATORY NEGATIVE: 1
ANAL BLEEDING: 0
ALLERGIC/IMMUNOLOGIC NEGATIVE: 1
ABDOMINAL PAIN: 0
VOMITING: 0

## 2020-08-10 NOTE — PROGRESS NOTES
Subjective:      Patient ID: Trina Hernanedz is a 63 y.o. female    Pt here to discuss Dexa results. T score -2.6. Discussed osteopenia and risks of spontaneous fracture from osteopenia/osteoprorosis. Discussed prevention measures including Vitamin D and Calcium supplements, weight bearing exercise and meds with risks and benefits. Pt has opted to start Boniva as directed. F/U annual exam or prn. Vitals:  /78   Wt 162 lb (73.5 kg)   BMI 27.81 kg/m²   Past Medical History:   Diagnosis Date    Breast abscess of female 11/28/2019    Essential hypertension 11/28/2019    IBS (irritable bowel syndrome)     Migraines     PVD (posterior vitreous detachment), right      Past Surgical History:   Procedure Laterality Date   Kurt Large EYE SURGERY  11/2018    NASAL SEPTUM SURGERY      TUBAL LIGATION      VARICOSE VEIN SURGERY       Allergies: Iodides; Nsaids; Adhesive tape; and Other  Current Outpatient Medications   Medication Sig Dispense Refill    clobetasol (TEMOVATE) 0.05 % ointment Apply 0.5 each topically Twice a Week Apply topically 2 times daily. 30 g 2    rizatriptan (MAXALT) 10 MG tablet       Polyethylene Glycol 3350 (MIRALAX PO)       loratadine (CLARITIN) 10 MG tablet Take 10 mg by mouth      prednisoLONE (ORAPRED) 15 MG/5ML solution Take 60 mg by mouth      Riboflavin 100 MG TABS Take by mouth      atenolol (TENORMIN) 50 MG tablet take 1 tablet by mouth once daily  0    magnesium 30 MG tablet Take 30 mg by mouth 2 times daily      Coenzyme Q10 (CO Q-10 MAXIMUM STRENGTH PO) Take by mouth      eletriptan (RELPAX) 40 MG tablet Take 40 mg by mouth once as needed may repeat in 2 hours if necessary      famotidine (PEPCID) 20 MG tablet   1     No current facility-administered medications for this visit.       Social History     Socioeconomic History    Marital status:      Spouse name: Not on file    Number of children: Not on file    Years of education: Not on unexpected weight change. HENT: Negative. Eyes: Negative. Respiratory: Negative. Cardiovascular: Negative. Gastrointestinal: Negative for abdominal distention, abdominal pain, anal bleeding, blood in stool, constipation, diarrhea, nausea, rectal pain and vomiting. Endocrine: Negative. Genitourinary: Negative for decreased urine volume, difficulty urinating, dyspareunia, dysuria, enuresis, flank pain, frequency, genital sores, hematuria, menstrual problem, pelvic pain, urgency, vaginal bleeding, vaginal discharge and vaginal pain. Musculoskeletal: Negative. Skin: Negative. Allergic/Immunologic: Negative. Neurological: Negative. Hematological: Negative. Psychiatric/Behavioral: Negative. Objective:     Physical Exam  Constitutional:       General: She is not in acute distress. Appearance: She is well-developed. She is not diaphoretic. HENT:      Head: Normocephalic and atraumatic. Eyes:      Conjunctiva/sclera: Conjunctivae normal.   Neck:      Musculoskeletal: Normal range of motion and neck supple. Cardiovascular:      Rate and Rhythm: Normal rate and regular rhythm. Pulmonary:      Effort: Pulmonary effort is normal. No respiratory distress. Musculoskeletal: Normal range of motion. General: No tenderness or deformity. Skin:     General: Skin is warm and dry. Coloration: Skin is not pale. Neurological:      Mental Status: She is alert and oriented to person, place, and time. Motor: No abnormal muscle tone. Coordination: Coordination normal.   Psychiatric:         Behavior: Behavior normal.         Thought Content: Thought content normal.         Judgment: Judgment normal.         Assessment:       Diagnosis Orders   1. Follow up     2.  Osteoporosis, unspecified osteoporosis type, unspecified pathological fracture presence          Plan:      Medications placedthis encounter:  No orders of the defined types were placed in this encounter. Orders placedthis encounter:  No orders of the defined types were placed in this encounter.         Follow up:  Return for Annual.

## 2020-12-08 PROBLEM — M54.17 LUMBOSACRAL RADICULOPATHY: Status: ACTIVE | Noted: 2020-12-08

## 2020-12-08 PROBLEM — M51.26 LUMBAR DISC HERNIATION: Status: ACTIVE | Noted: 2020-12-08

## 2020-12-08 PROBLEM — Z87.19 H/O: GI BLEED: Status: ACTIVE | Noted: 2020-12-08

## 2021-02-01 ENCOUNTER — OFFICE VISIT (OUTPATIENT)
Dept: PODIATRY | Facility: CLINIC | Age: 61
End: 2021-02-01

## 2021-02-01 VITALS — WEIGHT: 160 LBS | BODY MASS INDEX: 27.31 KG/M2 | HEIGHT: 64 IN | TEMPERATURE: 96.9 F

## 2021-02-01 DIAGNOSIS — S84.12XA COMMON PERONEAL NERVE DYSFUNCTION OF LEFT LOWER EXTREMITY, INITIAL ENCOUNTER: ICD-10-CM

## 2021-02-01 DIAGNOSIS — S93.492A SPRAIN OF ANTERIOR TALOFIBULAR LIGAMENT OF LEFT ANKLE, INITIAL ENCOUNTER: ICD-10-CM

## 2021-02-01 DIAGNOSIS — M25.572 ACUTE LEFT ANKLE PAIN: Primary | ICD-10-CM

## 2021-02-01 ASSESSMENT — ENCOUNTER SYMPTOMS
DIARRHEA: 0
BACK PAIN: 1
CONSTIPATION: 0
NAUSEA: 0
SHORTNESS OF BREATH: 0

## 2021-02-01 NOTE — PROGRESS NOTES
Pb Sheppard  (1960)    02/1/2021    Subjective     Pb Sheppard is 63 y.o. female who complains today of:    Chief Complaint   Patient presents with    Ankle Pain     Left     HPI: The patient presents with complaint of left ankle pain. She describes the pain as sharp and tingling. Patient states that she notices late last night and the pain was worse this morning. Patient does not recall trauma that she did observe swelling to the lateral ankle, she has a photograph on her phone showing edema to the lateral left ankle. Patient describes having cramping to the left leg like she was about to have a \"charley horse\" last night. Patient states that she is scheduled to undergo an EMG in 2 days. Patient states that she has not observed any redness or bruising to the ankle. Foot Pain   Pertinent negatives include no fever. Review of Systems   Constitutional: Negative for fever. HENT: Negative for hearing loss. Respiratory: Negative for shortness of breath. Gastrointestinal: Negative for constipation, diarrhea and nausea. Genitourinary: Negative for difficulty urinating. Musculoskeletal: Positive for back pain. Negative for neck pain. Skin: Negative for rash. Neurological: Positive for headaches. Hematological: Does not bruise/bleed easily. Psychiatric/Behavioral: Negative for sleep disturbance. She has tried physical therapy. Date of last of last PT treatment: January 2021 with [facility]    The patient is not a diabetic. Allergies: Iodides, Nsaids, Adhesive tape, and Other    Current Outpatient Medications on File Prior to Visit   Medication Sig Dispense Refill    lidocaine (LIDODERM) 5 % Place 1 patch onto the skin daily 12 hours on, 12 hours off.  30 patch 0  ibandronate (BONIVA) 150 MG tablet Take 1 tablet by mouth every 30 days Take one (1) tablet once per month in the morning with a full glass of water, on an empty stomach, and do not take anything else by mouth or lie down for the next 30 minutes. 30 tablet 3    clobetasol (TEMOVATE) 0.05 % ointment Apply 0.5 each topically Twice a Week Apply topically 2 times daily. 30 g 2    rizatriptan (MAXALT) 10 MG tablet       Polyethylene Glycol 3350 (MIRALAX PO)       loratadine (CLARITIN) 10 MG tablet Take 10 mg by mouth      prednisoLONE (ORAPRED) 15 MG/5ML solution Take 60 mg by mouth      Riboflavin 100 MG TABS Take by mouth      atenolol (TENORMIN) 50 MG tablet take 1 tablet by mouth once daily  0    magnesium 30 MG tablet Take 30 mg by mouth 2 times daily      Coenzyme Q10 (CO Q-10 MAXIMUM STRENGTH PO) Take by mouth      eletriptan (RELPAX) 40 MG tablet Take 40 mg by mouth once as needed may repeat in 2 hours if necessary      famotidine (PEPCID) 20 MG tablet   1     No current facility-administered medications on file prior to visit.         Past Medical History:   Diagnosis Date    Breast abscess of female 11/28/2019    Essential hypertension 11/28/2019    IBS (irritable bowel syndrome)     Migraines     PVD (posterior vitreous detachment), right      Past Surgical History:   Procedure Laterality Date   Shaune Amas EYE SURGERY  11/2018    NASAL SEPTUM SURGERY      TUBAL LIGATION      VARICOSE VEIN SURGERY       Social History     Socioeconomic History    Marital status:      Spouse name: Not on file    Number of children: Not on file    Years of education: Not on file    Highest education level: Not on file   Occupational History    Not on file   Social Needs    Financial resource strain: Not on file    Food insecurity     Worry: Not on file     Inability: Not on file    Transportation needs     Medical: Not on file     Non-medical: Not on file   Tobacco Use  Smoking status: Never Smoker    Smokeless tobacco: Never Used   Substance and Sexual Activity    Alcohol use: No     Alcohol/week: 0.0 standard drinks    Drug use: No    Sexual activity: Yes     Partners: Male   Lifestyle    Physical activity     Days per week: Not on file     Minutes per session: Not on file    Stress: Not on file   Relationships    Social connections     Talks on phone: Not on file     Gets together: Not on file     Attends Alevism service: Not on file     Active member of club or organization: Not on file     Attends meetings of clubs or organizations: Not on file     Relationship status: Not on file    Intimate partner violence     Fear of current or ex partner: Not on file     Emotionally abused: Not on file     Physically abused: Not on file     Forced sexual activity: Not on file   Other Topics Concern    Not on file   Social History Narrative    Not on file     Family History   Problem Relation Age of Onset    Alzheimer's Disease Father     Hypertension Mother     Diabetes Mother     Cancer Mother     No Known Problems Paternal Grandfather     No Known Problems Paternal Grandmother     No Known Problems Maternal Grandmother     No Known Problems Maternal Grandfather     No Known Problems Brother     No Known Problems Sister     No Known Problems Other     Breast Cancer Neg Hx     Colon Cancer Neg Hx     Eclampsia Neg Hx     Ovarian Cancer Neg Hx      Labor Neg Hx     Spont Abortions Neg Hx     Stroke Neg Hx            Objective:   Vitals:  Temp 96.9 °F (36.1 °C)   Ht 5' 4\" (1.626 m)   Wt 160 lb (72.6 kg)   BMI 27.46 kg/m² Pain Score:   7    Physical Exam  Vascular:   Dorsalis pedis pulse is palpable bilateral foot. Posterior tibial pulse is palpable bilateral foot. There is focal nonpitiing edema noted to the lateral left ankle. Capillary refill is immediate bilateral hallux. There are mild varicosities noted bilaterally. There are mild telangiectasia noted bilaterally. Skin temperature gradient is noted to be warm to warm bilaterally. There are no signs of ischemia or skin atrophy noted bilaterally. Hair growth is present bilaterally. Neurological:   Light touch and protective sensation is intact bilaterally. Vibratory sensation is intact bilaterally. Hot versus cold discrimination is intact bilaterally. Sharp versus dull discrimination is intact bilaterally. Patellar deep tendon reflex is graded at 2/4 bilaterally. Achilles tendon deep tendon reflex is graded at 2/4 bilaterally. The Babinski response is negative bilaterally. No ankle clonus is noted bilaterally. Positive Tinel sign (radiating pain) elicited with percussion of the left common peroneal nerve distal to the fibular head. Dermatological:  No open lesions noted bilateral foot. Surgical cicatrices noted to the bilateral first ray. The toenails are incurvated and are well groomed bilaterally. Normal skin texture noted bilaterally. No focal hyperkeratotic lesions noted. Normal skin turgor noted bilaterally. Webspace 1-4 is dry and intact bilateral foot. Erythema noted overlying the bilateral 5th MPJ. Musculoskeletal:  Rectus foot type noted bilaterally. Manual muscle strength is graded at 5/5 for all groups tested bilateral foot. Gross static deformities noted: tailor's bunion deformity with adductovarus fifth digit noted bilaterally. Pain or crepitus noted with active or passive range of motion of the joints of the foot or ankle: left ankle. Decreased ankle joint dorsiflexion noted bilateral lower extremity. There is tenderness palpation at the left lateral ankle anterior talofibular ligament, no ecchymosis or erythema noted. Anterior drawer, external rotation, and distal squeeze exams are within normal limits. No gross deformity noted with talar tilt. No pain noted to the base of the fifth metatarsal or the Achilles tendon insertion left lower extremity. Assessment:      Diagnosis Orders   1. Acute left ankle pain  SD AFO ANKLE GAUNTLET PRE OTS    SD STRAPPING; ANKLE &/OR FOOT   2. Sprain of anterior talofibular ligament of left ankle, initial encounter  SD AFO ANKLE GAUNTLET PRE OTS    SD STRAPPING; ANKLE &/OR FOOT   3. Common peroneal nerve dysfunction of left lower extremity, initial encounter         Plan:     Left leg neuritis: patient instructed to apply topical lidocaine to the common peroneal nerve at the lateral left knee, she was specifically told not to apply it Tuesday night prior to her upcoming EMG exam.    Left ATFL sprain: A compression wrap and ankle strapping was applied. Patient started to apply an ice pack to the painful area for 10 minutes twice a day. I have submitted an order for an ASO/ankle brace. Patient will need the device for 3-6 months depending on her recovery. Patient is to wear at all times ambulating. Patient will require the  device to stabilize the left ankle to help prevent further injury and to allow her to perform ADL's with decreased discomfort. Orders Placed This Encounter   Procedures    SD AFO ANKLE GAUNTLET PRE OTS     Standing Status:   Future     Standing Expiration Date:   5/1/2021    SD STRAPPING; ANKLE &/OR FOOT     Follow up:  Return in about 3 weeks (around 2/22/2021). Maykel Barbosa DPM      Total time spent in patient care: 26 minutes. Level of medical decision making: low. Please note that this report has been partially produced using speech recognition software which may cause errors including grammar, punctuation, and spelling or words and phrases that may seem inappropriate. If there are questions or concerns please feel free to contact me for clarification.

## 2021-02-18 ENCOUNTER — HOSPITAL ENCOUNTER (OUTPATIENT)
Dept: MRI IMAGING | Age: 61
Discharge: HOME | End: 2021-02-20
Payer: COMMERCIAL

## 2021-02-18 ENCOUNTER — HOSPITAL ENCOUNTER (OUTPATIENT)
Dept: GENERAL RADIOLOGY | Age: 61
Discharge: HOME | End: 2021-02-20
Payer: COMMERCIAL

## 2021-02-18 DIAGNOSIS — M54.16 LUMBAR RADICULOPATHY: ICD-10-CM

## 2021-02-18 DIAGNOSIS — M79.605 LEFT LEG PAIN: ICD-10-CM

## 2021-02-18 PROCEDURE — 72148 MRI LUMBAR SPINE W/O DYE: CPT

## 2021-02-18 PROCEDURE — 72110 X-RAY EXAM L-2 SPINE 4/>VWS: CPT

## 2021-02-22 ENCOUNTER — OFFICE VISIT (OUTPATIENT)
Dept: PODIATRY | Facility: CLINIC | Age: 61
End: 2021-02-22

## 2021-02-22 VITALS — TEMPERATURE: 97.1 F | WEIGHT: 160 LBS | BODY MASS INDEX: 27.31 KG/M2 | HEIGHT: 64 IN

## 2021-02-22 DIAGNOSIS — M25.572 ACUTE LEFT ANKLE PAIN: ICD-10-CM

## 2021-02-22 DIAGNOSIS — G57.92 NEURITIS OF LEFT FOOT: ICD-10-CM

## 2021-02-22 DIAGNOSIS — M76.72 PERONEAL TENDINITIS, LEFT: ICD-10-CM

## 2021-02-22 DIAGNOSIS — S93.492A SPRAIN OF ANTERIOR TALOFIBULAR LIGAMENT OF LEFT ANKLE, INITIAL ENCOUNTER: ICD-10-CM

## 2021-02-22 DIAGNOSIS — M79.672 LEFT FOOT PAIN: Primary | ICD-10-CM

## 2021-02-22 ASSESSMENT — ENCOUNTER SYMPTOMS
SHORTNESS OF BREATH: 0
DIARRHEA: 0
BACK PAIN: 1
NAUSEA: 0
CONSTIPATION: 0

## 2021-02-22 NOTE — PATIENT INSTRUCTIONS
Patient Education        Ankle Sprain: Rehab Exercises  Introduction  Here are some examples of exercises for you to try. The exercises may be suggested for a condition or for rehabilitation. Start each exercise slowly. Ease off the exercises if you start to have pain. You will be told when to start these exercises and which ones will work best for you. How to do the exercises  \"Alphabet\" exercise   1. Trace the alphabet with your toe. This helps your ankle move in all directions. Side-to-side knee swing exercise   1. Sit in a chair with your foot flat on the floor. 2. Slowly move your knee from side to side. Keep your foot pressed flat. 3. Continue this exercise for 2 to 3 minutes. Towel curl   1. While sitting, place your foot on a towel on the floor. Scrunch the towel toward you with your toes. 2. Then use your toes to push the towel away from you. 3. To make this exercise more challenging you can put something on the other end of the towel. A can of soup is about the right weight for this. Towel stretch   1. Sit with your legs extended and knees straight. 2. Place a towel around your foot just under the toes. 3. Hold each end of the towel in each hand, with your hands above your knees. 4. Pull back with the towel so that your foot stretches toward you. 5. Hold the position for at least 15 to 30 seconds. 6. Repeat 2 to 4 times a session. Do up to 5 sessions a day. Ankle eversion exercise   1. Start by sitting with your foot flat on the floor. Push your foot outward against a wall or a piece of furniture that doesn't move. Hold for about 6 seconds, and relax. Repeat 8 to 12 times. 2. After you feel comfortable with this, try using rubber tubing looped around the outside of your feet for resistance. Push your foot out to the side against the tubing, and then count to 10 as you slowly bring your foot back to the middle. Repeat 8 to 12 times.     Isometric opposition exercises 1. While sitting, put your feet together flat on the floor. 2. Press your injured foot inward against your other foot. Hold for about 6 seconds, and relax. Repeat 8 to 12 times. 3. Then place the heel of your other foot on top of the injured one. Push down with the top heel while trying to push up with your injured foot. Hold for about 6 seconds, and relax. Repeat 8 to 12 times. Resisted ankle inversion   1. Sit on the floor with your good leg crossed over your other leg. 2. Hold both ends of an exercise band and loop the band around the inside of your affected foot. Then press your other foot against the band. 3. Keeping your legs crossed, slowly push your affected foot against the band so that foot moves away from your other foot. Then slowly relax. 4. Repeat 8 to 12 times. Resisted ankle eversion   1. Sit on the floor with your legs straight. 2. Hold both ends of an exercise band and loop the band around the outside of your affected foot. Then press your other foot against the band. 3. Keeping your leg straight, slowly push your affected foot outward against the band and away from your other foot without letting your leg rotate. Then slowly relax. 4. Repeat 8 to 12 times. Resisted ankle dorsiflexion   1. Tie the ends of an exercise band together to form a loop. Attach one end of the loop to a secure object or shut a door on it to hold it in place. (Or you can have someone hold one end of the loop to provide resistance.)  2. While sitting on the floor or in a chair, loop the other end of the band over the top of your affected foot. 3. Keeping your knee and leg straight, slowly flex your foot to pull back on the exercise band, and then slowly relax. 4. Repeat 8 to 12 times.     Single-leg balance 1. Stand on a flat surface with your arms stretched out to your sides like you are making the letter \"T. \" Then lift your good leg off the floor, bending it at the knee. If you are not steady on your feet, use one hand to hold on to a chair, counter, or wall. 2. Standing on the leg with your affected ankle, keep that knee straight. Try to balance on that leg for up to 30 seconds. Then rest for up to 10 seconds. 3. Repeat 6 to 8 times. 4. When you can balance on your affected leg for 30 seconds with your eyes open, try to balance on it with your eyes closed. 5. When you can do this exercise with your eyes closed for 30 seconds and with ease and no pain, try standing on a pillow or piece of foam, and repeat steps 1 through 4. Follow-up care is a key part of your treatment and safety. Be sure to make and go to all appointments, and call your doctor if you are having problems. It's also a good idea to know your test results and keep a list of the medicines you take. Where can you learn more? Go to https://[employer]. org and sign in to your [employer] account. Enter Tatiana Hernandez in the [facility] box to learn more about \"Ankle Sprain: Rehab Exercises. \"     If you do not have an account, please click on the \"Sign Up Now\" link. Current as of: March 2, 2020               Content Version: 12.6  © 2214-9457 [employer]. Care instructions adapted under license by [facility] ([facility]). If you have questions about a medical condition or this instruction, always ask your healthcare professional. [place] [address] any warranty or liability for your use of this information.          Patient Education

## 2021-02-22 NOTE — PROGRESS NOTES
Alphonse Khanness  (1960)    02/22/2021     Subjective     Alphonse Hernandez is 63 y.o. female who complains today of:    Chief Complaint   Patient presents with    Ankle Pain     left ankle     HPI: The patient presents for follow up for a left foot and ankle injury. She denies significant improvement. She has not been able to wear the ASO on a regular basis due to weakness of her thumbs making applying it difficult. Patient reports that she had an EMG performed since her last visit, it was negative. She reports continued tingling and burning sensation to the left heel. She also continues to have pain at the ball of the foot. Foot Pain   Pertinent negatives include no fever. Ankle Pain         Review of Systems   Constitutional: Negative for fever. HENT: Negative for hearing loss. Respiratory: Negative for shortness of breath. Gastrointestinal: Negative for constipation, diarrhea and nausea. Genitourinary: Negative for difficulty urinating. Musculoskeletal: Positive for back pain. Negative for neck pain. Skin: Negative for rash. Neurological: Positive for headaches. Hematological: Does not bruise/bleed easily. Psychiatric/Behavioral: Negative for sleep disturbance. She has tried physical therapy. Date of last of last PT treatment: January 2021 with [facility]    The patient is not a diabetic. Allergies: Iodides, Nsaids, Adhesive tape, and Other    Current Outpatient Medications on File Prior to Visit   Medication Sig Dispense Refill    lidocaine (LIDODERM) 5 % Place 1 patch onto the skin daily 12 hours on, 12 hours off. 30 patch 0    ibandronate (BONIVA) 150 MG tablet Take 1 tablet by mouth every 30 days Take one (1) tablet once per month in the morning with a full glass of water, on an empty stomach, and do not take anything else by mouth or lie down for the next 30 minutes.  30 tablet 3    clobetasol (TEMOVATE) 0.05 % ointment Apply 0.5 each topically Twice a Week Apply topically 2 times daily. 30 g 2    rizatriptan (MAXALT) 10 MG tablet       Polyethylene Glycol 3350 (MIRALAX PO)       loratadine (CLARITIN) 10 MG tablet Take 10 mg by mouth      prednisoLONE (ORAPRED) 15 MG/5ML solution Take 60 mg by mouth      Riboflavin 100 MG TABS Take by mouth      atenolol (TENORMIN) 50 MG tablet take 1 tablet by mouth once daily  0    magnesium 30 MG tablet Take 30 mg by mouth 2 times daily      Coenzyme Q10 (CO Q-10 MAXIMUM STRENGTH PO) Take by mouth      eletriptan (RELPAX) 40 MG tablet Take 40 mg by mouth once as needed may repeat in 2 hours if necessary      famotidine (PEPCID) 20 MG tablet   1     No current facility-administered medications on file prior to visit.         Past Medical History:   Diagnosis Date    Breast abscess of female 11/28/2019    Essential hypertension 11/28/2019    IBS (irritable bowel syndrome)     Migraines     PVD (posterior vitreous detachment), right      Past Surgical History:   Procedure Laterality Date   [facility] EYE SURGERY  11/2018    NASAL SEPTUM SURGERY      TUBAL LIGATION      VARICOSE VEIN SURGERY       Social History     Socioeconomic History    Marital status:      Spouse name: Not on file    Number of children: Not on file    Years of education: Not on file    Highest education level: Not on file   Occupational History    Not on file   Social Needs    Financial resource strain: Not on file    Food insecurity     Worry: Not on file     Inability: Not on file    Transportation needs     Medical: Not on file     Non-medical: Not on file   Tobacco Use    Smoking status: Never Smoker    Smokeless tobacco: Never Used   Substance and Sexual Activity    Alcohol use: No     Alcohol/week: 0.0 standard drinks    Drug use: No    Sexual activity: Yes     Partners: Male   Lifestyle    Physical activity     Days per week: Not on file     Minutes per session: Not on file    Stress: Not on file plan dispensed to the patient. I have reccommended that she place toe crest pad on the left 2nd toe to decrease pressure on the forefoot. A repeat x-ray of the left foot ordered to evaluate the calcaneus, a lucency was noted on prior imaging which may be the apophysis suture, will evaluate for sclerosis or bony callus formation consistent with stress fracture. Orders Placed This Encounter   Procedures    XR FOOT LEFT (MIN 3 VIEWS)     Standing Status:   Future     Standing Expiration Date:   2/22/2022     Order Specific Question:   Reason for exam:     Answer:   heel pain, injury     Follow up:  Return in about 2 weeks (around 3/8/2021). Gilma Atkins DPM      Total time spent in patient care: 26 minutes. Level of medical decision making: low. Please note that this report has been partially produced using speech recognition software which may cause errors including grammar, punctuation, and spelling or words and phrases that may seem inappropriate. If there are questions or concerns please feel free to contact me for clarification.

## 2021-02-25 DIAGNOSIS — M79.672 LEFT FOOT PAIN: ICD-10-CM

## 2021-02-25 PROBLEM — M43.17 ACQUIRED SPONDYLOLISTHESIS OF LUMBOSACRAL REGION: Status: ACTIVE | Noted: 2021-02-25

## 2021-03-08 ENCOUNTER — OFFICE VISIT (OUTPATIENT)
Dept: PODIATRY | Facility: CLINIC | Age: 61
End: 2021-03-08

## 2021-03-08 VITALS — WEIGHT: 160 LBS | BODY MASS INDEX: 27.31 KG/M2 | HEIGHT: 64 IN | TEMPERATURE: 97.1 F

## 2021-03-08 DIAGNOSIS — G57.92 NEURITIS OF LEFT FOOT: ICD-10-CM

## 2021-03-08 DIAGNOSIS — S93.492D SPRAIN OF ANTERIOR TALOFIBULAR LIGAMENT OF LEFT ANKLE, SUBSEQUENT ENCOUNTER: ICD-10-CM

## 2021-03-08 DIAGNOSIS — M20.5X2 ACQUIRED HALLUX LIMITUS OF LEFT FOOT: ICD-10-CM

## 2021-03-08 DIAGNOSIS — M20.5X1 HALLUX LIMITUS, ACQUIRED, RIGHT: ICD-10-CM

## 2021-03-08 DIAGNOSIS — M21.621 TAILOR'S BUNIONETTE, BILATERAL: ICD-10-CM

## 2021-03-08 DIAGNOSIS — M76.72 PERONEAL TENDINITIS, LEFT: ICD-10-CM

## 2021-03-08 DIAGNOSIS — M79.672 LEFT FOOT PAIN: Primary | ICD-10-CM

## 2021-03-08 DIAGNOSIS — M21.622 TAILOR'S BUNIONETTE, BILATERAL: ICD-10-CM

## 2021-03-08 DIAGNOSIS — G57.62 LESION OF LEFT PLANTAR NERVE: ICD-10-CM

## 2021-03-08 ASSESSMENT — ENCOUNTER SYMPTOMS
SHORTNESS OF BREATH: 0
DIARRHEA: 0
NAUSEA: 0
BACK PAIN: 1
CONSTIPATION: 0

## 2021-03-08 NOTE — PROGRESS NOTES
Pb Sheppard  (1960)    03/08/2021     Subjective     Pb Sheppard is 63 y.o. female who complains today of:    Chief Complaint   Patient presents with    Foot Pain     Left     HPI: The patient presents for follow up for a left foot and ankle pain. She states that she is considering proceeding with back surgery. She continues to have burning and tingling pain in the left heel and forefoot. She reports decreased pain to the left 2nd toe with use of padding. She had x-rays taken since her last visit. She reports decreased left ankle pain, she has been able to wear the ASO brace on a more consistent basis. Ankle Pain     Foot Pain   Pertinent negatives include no fever. Review of Systems   Constitutional: Negative for fever. HENT: Negative for hearing loss. Respiratory: Negative for shortness of breath. Gastrointestinal: Negative for constipation, diarrhea and nausea. Genitourinary: Negative for difficulty urinating. Musculoskeletal: Positive for back pain. Negative for neck pain. Skin: Negative for rash. Neurological: Positive for headaches. Hematological: Does not bruise/bleed easily. Psychiatric/Behavioral: Negative for sleep disturbance. She has tried physical therapy. Date of last of last PT treatment: January 2021 with [facility]    The patient is not a diabetic. Allergies: Iodides, Nsaids, Adhesive tape, and Other    Current Outpatient Medications on File Prior to Visit   Medication Sig Dispense Refill    tiZANidine (ZANAFLEX) 4 MG tablet Take 1 tablet by mouth 3 times daily as needed (pain and spasm) 90 tablet 2    ibandronate (BONIVA) 150 MG tablet Take 1 tablet by mouth every 30 days Take one (1) tablet once per month in the morning with a full glass of water, on an empty stomach, and do not take anything else by mouth or lie down for the next 30 minutes.  30 tablet 3    clobetasol (TEMOVATE) 0.05 % ointment Apply 0.5 each topically Twice a Week Apply topically 2 times daily. 30 g 2    rizatriptan (MAXALT) 10 MG tablet       Polyethylene Glycol 3350 (MIRALAX PO)       loratadine (CLARITIN) 10 MG tablet Take 10 mg by mouth      prednisoLONE (ORAPRED) 15 MG/5ML solution Take 60 mg by mouth      Riboflavin 100 MG TABS Take by mouth      atenolol (TENORMIN) 50 MG tablet take 1 tablet by mouth once daily  0    magnesium 30 MG tablet Take 30 mg by mouth 2 times daily      Coenzyme Q10 (CO Q-10 MAXIMUM STRENGTH PO) Take by mouth      eletriptan (RELPAX) 40 MG tablet Take 40 mg by mouth once as needed may repeat in 2 hours if necessary      famotidine (PEPCID) 20 MG tablet   1     No current facility-administered medications on file prior to visit.         Past Medical History:   Diagnosis Date    Breast abscess of female 11/28/2019    Essential hypertension 11/28/2019    IBS (irritable bowel syndrome)     Migraines     PVD (posterior vitreous detachment), right      Past Surgical History:   Procedure Laterality Date   [facility]YE SURGERY  11/2018    NASAL SEPTUM SURGERY      TUBAL LIGATION      VARICOSE VEIN SURGERY       Social History     Socioeconomic History    Marital status:      Spouse name: Not on file    Number of children: Not on file    Years of education: Not on file    Highest education level: Not on file   Occupational History    Not on file   Social Needs    Financial resource strain: Not on file    Food insecurity     Worry: Not on file     Inability: Not on file    Transportation needs     Medical: Not on file     Non-medical: Not on file   Tobacco Use    Smoking status: Never Smoker    Smokeless tobacco: Never Used   Substance and Sexual Activity    Alcohol use: No     Alcohol/week: 0.0 standard drinks    Drug use: No    Sexual activity: Yes     Partners: Male   Lifestyle    Physical activity     Days per week: Not on file     Minutes per session: Not on file    Stress: Not on file   Relationships    Social connections     Talks on phone: Not on file     Gets together: Not on file     Attends Gnosticist service: Not on file     Active member of club or organization: Not on file     Attends meetings of clubs or organizations: Not on file     Relationship status: Not on file    Intimate partner violence     Fear of current or ex partner: Not on file     Emotionally abused: Not on file     Physically abused: Not on file     Forced sexual activity: Not on file   Other Topics Concern    Not on file   Social History Narrative    Not on file     Family History   Problem Relation Age of Onset    Alzheimer's Disease Father     Hypertension Mother     Diabetes Mother     Cancer Mother     No Known Problems Paternal Grandfather     No Known Problems Paternal Grandmother     No Known Problems Maternal Grandmother     No Known Problems Maternal Grandfather     No Known Problems Brother     No Known Problems Sister     No Known Problems Other     Breast Cancer Neg Hx     Colon Cancer Neg Hx     Eclampsia Neg Hx     Ovarian Cancer Neg Hx      Labor Neg Hx     Spont Abortions Neg Hx     Stroke Neg Hx            Objective:   Vitals:  Temp 97.1 °F (36.2 °C)   Ht 5' 4\" (1.626 m)   Wt 160 lb (72.6 kg)   BMI 27.46 kg/m² Pain Score:   2    Physical Exam  Vascular:   Dorsalis pedis pulse is palpable bilateral foot. Posterior tibial pulse is palpable bilateral foot. There is trace nonpitiing edema noted to the lateral left ankle. Capillary refill is immediate bilateral hallux. There are mild varicosities noted bilaterally. There are mild telangiectasia noted bilaterally. Skin temperature gradient is noted to be warm to warm bilaterally. There are no signs of ischemia or skin atrophy noted bilaterally. Hair growth is present bilaterally. Neurological:   Light touch and protective sensation is intact bilaterally. Vibratory sensation is intact bilaterally.   Hot versus cold discrimination is intact bilaterally. Sharp versus dull discrimination is intact bilaterally. Patellar deep tendon reflex is graded at 2/4 bilaterally. Achilles tendon deep tendon reflex is graded at 2/4 bilaterally. The Babinski response is negative bilaterally. No ankle clonus is noted bilaterally. Positive Tinel sign (radiating pain) elicited with percussion of the left common peroneal nerve distal to the fibular head. Dermatological:  No open lesions noted bilateral foot. Surgical cicatrices noted to the bilateral first ray. The toenails are incurvated and are well groomed bilaterally. Normal skin texture noted bilaterally. No focal hyperkeratotic lesions noted. Normal skin turgor noted bilaterally. Webspace 1-4 is dry and intact bilateral foot. Erythema noted overlying the bilateral 5th MPJ. Musculoskeletal:  Rectus foot type noted bilaterally. Manual muscle strength is graded at 5/5 for all groups tested bilateral foot. There is pain with resisted eversion of the left foot, there is tenderness to palpation of the base of the 5th metatarsal.  Tailor's bunion deformity with adductovarus fifth digit noted bilaterally. Pain without crepitus noted with range of motion of the left ankle joint. Decreased ankle joint dorsiflexion noted bilateral lower extremity. There is tenderness palpation at the left lateral ankle anterior talofibular ligament, no ecchymosis or erythema noted. There is decreased dorsiflexion available to the bilateral 1st MPJ with simulated weightbearing. Assessment:      Diagnosis Orders   1. Left foot pain     2. Neuritis of left foot     3. Lesion of left plantar nerve     4. Tailor's bunionette, bilateral  OH FT INSERT UCB RANJITH SHELL   5. Acquired hallux limitus of left foot  OH FT INSERT UCB RANJITH SHELL   6. Hallux limitus, acquired, right  OH FT INSERT UCB RANJITH SHELL   7. Peroneal tendinitis, left     8.  Sprain of anterior talofibular ligament of left ankle, subsequent encounter         Plan:     Left ATFL sprain/ peroneal tendonitis: Patient instructed to continue use of ASO ankle brace and supportive shoes to protect the tendon/ ligament. Bilateral foot deformities: I have recommended functional custom orthotics. Neuritis left foot: Patient instructed to continue posterior muscle groups stretching to  pressure to the forefoot. Await her decision on spinal procedure. I will add an accommodative top cover to the orthotics with a metatarsal pad to offload the forefoot. Orders Placed This Encounter   Procedures    OH FT INSERT UCB RANJITH SHELL     Prescriptioin: Qty 2     Standing Status:   Future     Standing Expiration Date:   2021     Follow up:  Return for orthotic casting. Andres oRbles DPM      Total time spent in patient care: 20 minutes. Level of medical decision making: low. Please note that this report has been partially produced using speech recognition software which may cause errors including grammar, punctuation, and spelling or words and phrases that may seem inappropriate. If there are questions or concerns please feel free to contact me for clarification.

## 2021-06-15 ENCOUNTER — OFFICE VISIT (OUTPATIENT)
Dept: OBGYN CLINIC | Age: 61
End: 2021-06-15
Payer: COMMERCIAL

## 2021-06-15 VITALS
DIASTOLIC BLOOD PRESSURE: 86 MMHG | HEIGHT: 64 IN | WEIGHT: 166 LBS | BODY MASS INDEX: 28.34 KG/M2 | SYSTOLIC BLOOD PRESSURE: 134 MMHG

## 2021-06-15 DIAGNOSIS — Z01.419 ENCOUNTER FOR WELL WOMAN EXAM WITH ROUTINE GYNECOLOGICAL EXAM: Primary | ICD-10-CM

## 2021-06-15 DIAGNOSIS — Z12.31 SCREENING MAMMOGRAM, ENCOUNTER FOR: ICD-10-CM

## 2021-06-15 PROCEDURE — 99396 PREV VISIT EST AGE 40-64: CPT | Performed by: OBSTETRICS & GYNECOLOGY

## 2021-06-15 RX ORDER — CLOBETASOL PROPIONATE 0.5 MG/G
0.5 OINTMENT TOPICAL
Qty: 30 G | Refills: 3 | Status: SHIPPED | OUTPATIENT
Start: 2021-06-17 | End: 2022-03-11 | Stop reason: SDUPTHER

## 2021-06-15 RX ORDER — CETIRIZINE HYDROCHLORIDE 10 MG/1
10 TABLET ORAL DAILY
COMMUNITY

## 2021-06-15 ASSESSMENT — ENCOUNTER SYMPTOMS
RESPIRATORY NEGATIVE: 1
ALLERGIC/IMMUNOLOGIC NEGATIVE: 1
NAUSEA: 0
ABDOMINAL DISTENTION: 0
BLOOD IN STOOL: 0
VOMITING: 0
CONSTIPATION: 0
ANAL BLEEDING: 0
ABDOMINAL PAIN: 0
EYES NEGATIVE: 1
RECTAL PAIN: 0
DIARRHEA: 0

## 2021-06-15 ASSESSMENT — PATIENT HEALTH QUESTIONNAIRE - PHQ9
SUM OF ALL RESPONSES TO PHQ QUESTIONS 1-9: 0
SUM OF ALL RESPONSES TO PHQ QUESTIONS 1-9: 0
2. FEELING DOWN, DEPRESSED OR HOPELESS: 0
SUM OF ALL RESPONSES TO PHQ QUESTIONS 1-9: 0
1. LITTLE INTEREST OR PLEASURE IN DOING THINGS: 0
SUM OF ALL RESPONSES TO PHQ9 QUESTIONS 1 & 2: 0

## 2021-06-15 ASSESSMENT — VISUAL ACUITY: OU: 1

## 2021-06-15 NOTE — PROGRESS NOTES
vulva.      Pubic Area: No rash. Labia:         Right: No rash, tenderness, lesion or injury. Left: No rash, tenderness, lesion or injury. Urethra: No prolapse, urethral swelling or urethral lesion. Vagina: Normal. No signs of injury and foreign body. No vaginal discharge, erythema, tenderness or bleeding. Cervix: No cervical motion tenderness, discharge or friability. Uterus: Not deviated, not enlarged, not fixed and not tender. Adnexa:         Right: No mass, tenderness or fullness. Left: No mass, tenderness or fullness. Rectum: Normal.   Musculoskeletal:         General: No tenderness. Normal range of motion. Cervical back: Normal range of motion and neck supple. Lymphadenopathy:      Cervical: No cervical adenopathy. Upper Body:      Right upper body: No supraclavicular or axillary adenopathy. Left upper body: No supraclavicular or axillary adenopathy. Lower Body: No right inguinal adenopathy. No left inguinal adenopathy. Skin:     General: Skin is warm and dry. Coloration: Skin is not pale. Findings: No erythema or rash. Neurological:      Mental Status: She is alert and oriented to person, place, and time. Psychiatric:         Behavior: Behavior normal.         Thought Content: Thought content normal.         Judgment: Judgment normal.         Assessment:       Diagnosis Orders   1. Encounter for well woman exam with routine gynecological exam     2. Screening mammogram, encounter for          Plan:      Medications placedthis encounter:  Orders Placed This Encounter   Medications    clobetasol (TEMOVATE) 0.05 % ointment     Sig: Apply 0.5 each topically Twice a Week Apply topically 2 times daily. Dispense:  30 g     Refill:  3         Orders placedthis encounter:  No orders of the defined types were placed in this encounter.         Follow up:  Return in about 1 year (around 6/15/2022) for Annual.   Repeat Annual GYN exam every 1 year. Cervical Cytology exam starts age 24. If Negative Cytology;  follow-up screening per current guidelines. Mammograms yearly starting at age 36. Calcium and Vitamin D dosing reviewed ( age appropriate ). Colonoscopy and bone density screening discussed ( age appropriate ). Birth control and STD prevention discussed ( age appropriate ). Gardisil counseling completed for all patients ( age appropriate ). Routine health maintenance ( per PCP and guidelines ). The patient was asked if she would like a chaperone present for her intimate exam. She  Declined the chaperone.  Vanessa Boateng MD

## 2021-08-10 DIAGNOSIS — M81.0 OSTEOPOROSIS, UNSPECIFIED OSTEOPOROSIS TYPE, UNSPECIFIED PATHOLOGICAL FRACTURE PRESENCE: ICD-10-CM

## 2021-08-10 RX ORDER — IBANDRONATE SODIUM 150 MG/1
TABLET, FILM COATED ORAL
Qty: 30 TABLET | Refills: 3 | Status: SHIPPED | OUTPATIENT
Start: 2021-08-10

## 2022-03-11 ENCOUNTER — TELEPHONE (OUTPATIENT)
Dept: OBGYN CLINIC | Age: 62
End: 2022-03-11

## 2022-03-11 RX ORDER — CLOBETASOL PROPIONATE 0.5 MG/G
0.5 OINTMENT TOPICAL
Qty: 30 G | Refills: 1 | Status: SHIPPED | OUTPATIENT
Start: 2022-03-14 | End: 2022-03-11 | Stop reason: SDUPTHER

## 2022-03-11 RX ORDER — CLOBETASOL PROPIONATE 0.5 MG/G
0.5 OINTMENT TOPICAL
Qty: 30 G | Refills: 1 | Status: SHIPPED | OUTPATIENT
Start: 2022-03-14 | End: 2022-06-16 | Stop reason: SDUPTHER

## 2022-03-11 NOTE — TELEPHONE ENCOUNTER
Received call from pharmacy stating need clarification,directions for Rx Clobetasol 0.05%,requesting a call back if possible.

## 2022-03-11 NOTE — TELEPHONE ENCOUNTER
Pt is calling for a refill for clobetasol (TEMOVATE) 0.05 % ointment     Her next appt is: 6/16/22        [address]LE [id number], [address]   [address], [facility] [address] [id number]   Phone:  [phone]  Fax:  [phone]

## 2022-06-16 ENCOUNTER — OFFICE VISIT (OUTPATIENT)
Dept: OBGYN CLINIC | Age: 62
End: 2022-06-16
Payer: COMMERCIAL

## 2022-06-16 ENCOUNTER — TELEPHONE (OUTPATIENT)
Dept: OBGYN CLINIC | Age: 62
End: 2022-06-16

## 2022-06-16 VITALS
SYSTOLIC BLOOD PRESSURE: 128 MMHG | BODY MASS INDEX: 28.34 KG/M2 | DIASTOLIC BLOOD PRESSURE: 80 MMHG | WEIGHT: 166 LBS | HEIGHT: 64 IN

## 2022-06-16 DIAGNOSIS — Z12.31 SCREENING MAMMOGRAM FOR BREAST CANCER: ICD-10-CM

## 2022-06-16 DIAGNOSIS — Z01.419 ENCOUNTER FOR WELL WOMAN EXAM WITH ROUTINE GYNECOLOGICAL EXAM: Primary | ICD-10-CM

## 2022-06-16 PROCEDURE — 99396 PREV VISIT EST AGE 40-64: CPT | Performed by: OBSTETRICS & GYNECOLOGY

## 2022-06-16 RX ORDER — CLOBETASOL PROPIONATE 0.5 MG/G
0.5 OINTMENT TOPICAL
Qty: 30 G | Refills: 3 | Status: SHIPPED | OUTPATIENT
Start: 2022-06-16

## 2022-06-16 ASSESSMENT — PATIENT HEALTH QUESTIONNAIRE - PHQ9
SUM OF ALL RESPONSES TO PHQ9 QUESTIONS 1 & 2: 0
SUM OF ALL RESPONSES TO PHQ QUESTIONS 1-9: 0
1. LITTLE INTEREST OR PLEASURE IN DOING THINGS: 0
2. FEELING DOWN, DEPRESSED OR HOPELESS: 0

## 2022-06-16 ASSESSMENT — ENCOUNTER SYMPTOMS
RESPIRATORY NEGATIVE: 1
RECTAL PAIN: 0
DIARRHEA: 0
ABDOMINAL DISTENTION: 0
VOMITING: 0
BLOOD IN STOOL: 0
CONSTIPATION: 0
ANAL BLEEDING: 0
EYES NEGATIVE: 1
NAUSEA: 0
ABDOMINAL PAIN: 0
ALLERGIC/IMMUNOLOGIC NEGATIVE: 1

## 2022-06-16 ASSESSMENT — VISUAL ACUITY: OU: 1

## 2022-06-16 NOTE — PROGRESS NOTES
Subjective:      Patient ID: Abigail Russo is a 60 y.o. female    Annual exam.  Reviewed medical, surgical, social and family history. Also reviewed current medications and allergies. No PMB. No GYN complaints. Pap deferred ( negative co-testing 2020 )  and screening mammogram ordered. Monthly SBE encouraged. Screening colonoscopy recommended per routine. F/U annual exam or prn. Vitals:  /80   Ht 5' 4\" (1.626 m)   Wt 166 lb (75.3 kg)   BMI 28.49 kg/m²   Past Medical History:   Diagnosis Date    Breast abscess of female 11/28/2019    Essential hypertension 11/28/2019    IBS (irritable bowel syndrome)     Migraines     PVD (posterior vitreous detachment), right      Past Surgical History:   Procedure Laterality Date   Abigail Mu EYE SURGERY  11/2018    NASAL SEPTUM SURGERY      TUBAL LIGATION      VARICOSE VEIN SURGERY       Allergies: Iodides, Nsaids, Adhesive tape, and Other  Current Outpatient Medications   Medication Sig Dispense Refill    clobetasol (TEMOVATE) 0.05 % ointment Apply 0.5 each topically Twice a Week 30 g 3    ibandronate (BONIVA) 150 MG tablet take 1 tablet by mouth every 30 DAYS take IN THE MORNING WITH A FULL GLASS OF WATER ON AN EMPTY STOMACH AND DO NOT TAKE ANYTHING ELSE BY MOUTH OR LIE DOWN FOR 30 MINUTES 30 tablet 3    cetirizine (ZYRTEC) 10 MG tablet Take 10 mg by mouth daily      rizatriptan (MAXALT) 10 MG tablet       Polyethylene Glycol 3350 (MIRALAX PO)       atenolol (TENORMIN) 50 MG tablet take 1 tablet by mouth once daily  0    magnesium 30 MG tablet Take 30 mg by mouth 2 times daily      Coenzyme Q10 (CO Q-10 MAXIMUM STRENGTH PO) Take by mouth      eletriptan (RELPAX) 40 MG tablet Take 40 mg by mouth once as needed may repeat in 2 hours if necessary      famotidine (PEPCID) 20 MG tablet   1     No current facility-administered medications for this visit.      Social History     Socioeconomic History    Marital status:      Spouse name: Not on file    Number of children: Not on file    Years of education: Not on file    Highest education level: Not on file   Occupational History    Not on file   Tobacco Use    Smoking status: Never Smoker    Smokeless tobacco: Never Used   Substance and Sexual Activity    Alcohol use: No     Alcohol/week: 0.0 standard drinks    Drug use: No    Sexual activity: Yes     Partners: Male   Other Topics Concern    Not on file   Social History Narrative    Not on file     Social Determinants of Health     Financial Resource Strain:     Difficulty of Paying Living Expenses: Not on file   Food Insecurity:     Worried About Running Out of Food in the Last Year: Not on file    Rakel of Food in the Last Year: Not on file   Transportation Needs:     Lack of Transportation (Medical): Not on file    Lack of Transportation (Non-Medical):  Not on file   Physical Activity:     Days of Exercise per Week: Not on file    Minutes of Exercise per Session: Not on file   Stress:     Feeling of Stress : Not on file   Social Connections:     Frequency of Communication with Friends and Family: Not on file    Frequency of Social Gatherings with Friends and Family: Not on file    Attends [employer]ism Services: Not on file    Active Member of [address] [place] [address] [employer] or Organizations: Not on file    Attends Club or Organization Meetings: Not on file    Marital Status: Not on file   Intimate Partner Violence:     Fear of Current or Ex-Partner: Not on file    Emotionally Abused: Not on file    Physically Abused: Not on file    Sexually Abused: Not on file   Housing Stability:     Unable to Pay for Housing in the Last Year: Not on file    Number of [place] in the Last Year: Not on file    Unstable Housing in the Last Year: Not on file     Family History   Problem Relation Age of Onset    Alzheimer's Disease Father     Hypertension Mother     Diabetes Mother     Cancer Mother     No Known Problems Paternal Grandfather    [place] No Known Problems Paternal Grandmother     No Known Problems Maternal Grandmother     No Known Problems Maternal Grandfather     No Known Problems Brother     No Known Problems Sister     No Known Problems Other     Breast Cancer Neg Hx     Colon Cancer Neg Hx     Eclampsia Neg Hx     Ovarian Cancer Neg Hx      Labor Neg Hx     Spont Abortions Neg Hx     Stroke Neg Hx        Review of Systems   Constitutional: Negative. Negative for activity change, appetite change, chills, diaphoresis, fatigue, fever and unexpected weight change. HENT: Negative. Eyes: Negative. Respiratory: Negative. Cardiovascular: Negative. Gastrointestinal: Negative for abdominal distention, abdominal pain, anal bleeding, blood in stool, constipation, diarrhea, nausea, rectal pain and vomiting. Endocrine: Negative. Genitourinary: Negative for decreased urine volume, difficulty urinating, dyspareunia, dysuria, enuresis, flank pain, frequency, genital sores, hematuria, menstrual problem, pelvic pain, urgency, vaginal bleeding, vaginal discharge and vaginal pain. Musculoskeletal: Negative. Skin: Negative. Allergic/Immunologic: Negative. Neurological: Negative. Hematological: Negative. Psychiatric/Behavioral: Negative. Objective:     Physical Exam  Constitutional:       Appearance: She is well-developed. HENT:      Head: Normocephalic. Eyes:      General: Lids are normal. Vision grossly intact. Neck:      Thyroid: No thyromegaly. Cardiovascular:      Rate and Rhythm: Normal rate and regular rhythm. Heart sounds: Normal heart sounds. Pulmonary:      Effort: Pulmonary effort is normal. No respiratory distress. Breath sounds: Normal breath sounds. No wheezing or rales. Chest:      Chest wall: No tenderness.    Breasts:      Right: Normal. No swelling, bleeding, inverted nipple, mass, nipple discharge, skin change, tenderness, axillary adenopathy or supraclavicular adenopathy. Left: Normal. No swelling, bleeding, inverted nipple, mass, nipple discharge, skin change, tenderness, axillary adenopathy or supraclavicular adenopathy. Abdominal:      General: There is no distension. Palpations: Abdomen is soft. There is no mass. Tenderness: There is no abdominal tenderness. There is no guarding or rebound. Hernia: No hernia is present. There is no hernia in the left inguinal area or right inguinal area. Genitourinary:     General: Normal vulva. Pubic Area: No rash. Labia:         Right: No rash, tenderness, lesion or injury. Left: No rash, tenderness, lesion or injury. Urethra: No prolapse, urethral swelling or urethral lesion. Vagina: Normal. No signs of injury and foreign body. No vaginal discharge, erythema, tenderness or bleeding. Cervix: No cervical motion tenderness, discharge or friability. Uterus: Not deviated, not enlarged, not fixed and not tender. Adnexa:         Right: No mass, tenderness or fullness. Left: No mass, tenderness or fullness. Rectum: Normal.   Musculoskeletal:         General: No tenderness. Normal range of motion. Cervical back: Normal range of motion and neck supple. Lymphadenopathy:      Cervical: No cervical adenopathy. Upper Body:      Right upper body: No supraclavicular or axillary adenopathy. Left upper body: No supraclavicular or axillary adenopathy. Lower Body: No right inguinal adenopathy. No left inguinal adenopathy. Skin:     General: Skin is warm and dry. Coloration: Skin is not pale. Findings: No erythema or rash. Neurological:      Mental Status: She is alert and oriented to person, place, and time. Psychiatric:         Behavior: Behavior normal.         Thought Content: Thought content normal.         Judgment: Judgment normal.         Assessment:       Diagnosis Orders   1.  Encounter for well woman exam with routine gynecological exam     2. Screening mammogram for breast cancer  LOGAN DIGITAL SCREEN W OR WO CAD BILATERAL        Plan:      Medications placedthis encounter:  Orders Placed This Encounter   Medications    clobetasol (TEMOVATE) 0.05 % ointment     Sig: Apply 0.5 each topically Twice a Week     Dispense:  30 g     Refill:  3         Orders placedthis encounter:  Orders Placed This Encounter   Procedures    LOGAN DIGITAL SCREEN W OR WO CAD BILATERAL     Standing Status:   Future     Standing Expiration Date:   6/16/2023         Follow up:  Return in about 1 year (around 6/16/2023) for Annual.   Repeat Annual GYN exam every 1 year. Cervical Cytology exam starts age 24. If Negative Cytology;  follow-up screening per current guidelines. Mammograms yearly starting at age 36. Calcium and Vitamin D dosing reviewed ( age appropriate ). Colonoscopy and bone density screening discussed ( age appropriate ). Birth control and STD prevention discussed ( age appropriate ). Gardisil counseling completed for all patients ( age appropriate ). Routine health maintenance ( per PCP and guidelines ). The patient was asked if she would like a chaperone present for her intimate exam. She  Declined the chaperone.  Thi Infante MD

## 2022-09-23 DIAGNOSIS — Z12.31 SCREENING MAMMOGRAM FOR BREAST CANCER: ICD-10-CM

## 2022-10-12 ENCOUNTER — OFFICE VISIT (OUTPATIENT)
Dept: OBGYN CLINIC | Age: 62
End: 2022-10-12
Payer: COMMERCIAL

## 2022-10-12 VITALS
DIASTOLIC BLOOD PRESSURE: 78 MMHG | BODY MASS INDEX: 28.17 KG/M2 | WEIGHT: 165 LBS | HEIGHT: 64 IN | SYSTOLIC BLOOD PRESSURE: 110 MMHG

## 2022-10-12 DIAGNOSIS — R10.30 LOWER ABDOMINAL PAIN: Primary | ICD-10-CM

## 2022-10-12 DIAGNOSIS — M54.50 LOW BACK PAIN, UNSPECIFIED BACK PAIN LATERALITY, UNSPECIFIED CHRONICITY, UNSPECIFIED WHETHER SCIATICA PRESENT: ICD-10-CM

## 2022-10-12 LAB
BILIRUBIN, POC: NORMAL
BLOOD URINE, POC: NORMAL
CLARITY, POC: NORMAL
COLOR, POC: NORMAL
GLUCOSE URINE, POC: NORMAL
KETONES, POC: NORMAL
LEUKOCYTE EST, POC: NORMAL
NITRITE, POC: NORMAL
PH, POC: 6
PROTEIN, POC: NORMAL
SPECIFIC GRAVITY, POC: 1.01
UROBILINOGEN, POC: NORMAL

## 2022-10-12 PROCEDURE — 1036F TOBACCO NON-USER: CPT | Performed by: OBSTETRICS & GYNECOLOGY

## 2022-10-12 PROCEDURE — 81002 URINALYSIS NONAUTO W/O SCOPE: CPT | Performed by: OBSTETRICS & GYNECOLOGY

## 2022-10-12 PROCEDURE — 3017F COLORECTAL CA SCREEN DOC REV: CPT | Performed by: OBSTETRICS & GYNECOLOGY

## 2022-10-12 PROCEDURE — G8427 DOCREV CUR MEDS BY ELIG CLIN: HCPCS | Performed by: OBSTETRICS & GYNECOLOGY

## 2022-10-12 PROCEDURE — G8484 FLU IMMUNIZE NO ADMIN: HCPCS | Performed by: OBSTETRICS & GYNECOLOGY

## 2022-10-12 PROCEDURE — 99213 OFFICE O/P EST LOW 20 MIN: CPT | Performed by: OBSTETRICS & GYNECOLOGY

## 2022-10-12 PROCEDURE — G8419 CALC BMI OUT NRM PARAM NOF/U: HCPCS | Performed by: OBSTETRICS & GYNECOLOGY

## 2022-10-12 ASSESSMENT — ENCOUNTER SYMPTOMS
ALLERGIC/IMMUNOLOGIC NEGATIVE: 1
RESPIRATORY NEGATIVE: 1
BACK PAIN: 1
ANAL BLEEDING: 0
BLOOD IN STOOL: 0
ABDOMINAL PAIN: 1
ABDOMINAL DISTENTION: 0
NAUSEA: 0
VOMITING: 0
RECTAL PAIN: 0
CONSTIPATION: 0
EYES NEGATIVE: 1
DIARRHEA: 0

## 2022-10-12 NOTE — PROGRESS NOTES
The patient was asked if she would like a chaperone present for her intimate exam. She  Declined the chaperone.  Nuha Rich CMA ([address] [place] [address])

## 2022-10-12 NOTE — PROGRESS NOTES
Patient here c/o lower abdominal pain and back pain x 3 days. Reviewed medical, surgical, social and family history. Also reviewed current medications and allergies. Denies dysuria, urgency or frequency. No diarrhea or constipation. No abnormal discharge. No VB. No trauma. H/O herniated discs and low back pain. Urine dip negative. SSE without discharge. Ordered US. May be more musculoskeletal in natures. NSAIDS as needed. F/U US results. Vitals:  /78   Ht 5' 4\" (1.626 m)   Wt 165 lb (74.8 kg)   BMI 28.32 kg/m²   Past Medical History:   Diagnosis Date    Breast abscess of female 11/28/2019    Essential hypertension 11/28/2019    IBS (irritable bowel syndrome)     Migraines     PVD (posterior vitreous detachment), right      Past Surgical History:   Procedure Laterality Date    BUNIONECTOMY      EYE SURGERY  11/2018    NASAL SEPTUM SURGERY      TUBAL LIGATION      VARICOSE VEIN SURGERY       Allergies: Iodides, Nsaids, Adhesive tape, and Other  Current Outpatient Medications   Medication Sig Dispense Refill    clobetasol (TEMOVATE) 0.05 % ointment Apply 0.5 each topically Twice a Week 30 g 3    ibandronate (BONIVA) 150 MG tablet take 1 tablet by mouth every 30 DAYS take IN THE MORNING WITH A FULL GLASS OF WATER ON AN EMPTY STOMACH AND DO NOT TAKE ANYTHING ELSE BY MOUTH OR LIE DOWN FOR 30 MINUTES 30 tablet 3    cetirizine (ZYRTEC) 10 MG tablet Take 10 mg by mouth daily      rizatriptan (MAXALT) 10 MG tablet       Polyethylene Glycol 3350 (MIRALAX PO)       atenolol (TENORMIN) 50 MG tablet take 1 tablet by mouth once daily  0    magnesium 30 MG tablet Take 30 mg by mouth 2 times daily      Coenzyme Q10 (CO Q-10 MAXIMUM STRENGTH PO) Take by mouth      eletriptan (RELPAX) 40 MG tablet Take 40 mg by mouth once as needed may repeat in 2 hours if necessary      famotidine (PEPCID) 20 MG tablet   1     No current facility-administered medications for this visit.      Social History Socioeconomic History    Marital status:      Spouse name: Not on file    Number of children: Not on file    Years of education: Not on file    Highest education level: Not on file   Occupational History    Not on file   Tobacco Use    Smoking status: Never    Smokeless tobacco: Never   Substance and Sexual Activity    Alcohol use: No     Alcohol/week: 0.0 standard drinks    Drug use: No    Sexual activity: Yes     Partners: Male   Other Topics Concern    Not on file   Social History Narrative    Not on file     Social Determinants of Health     Financial Resource Strain: Not on file   Food Insecurity: Not on file   Transportation Needs: Not on file   Physical Activity: Not on file   Stress: Not on file   Social Connections: Not on file   Intimate Partner Violence: Not on file   Housing Stability: Not on file        Family History   Problem Relation Age of Onset    Alzheimer's Disease Father     Hypertension Mother     Diabetes Mother     Cancer Mother     No Known Problems Paternal Grandfather     No Known Problems Paternal Grandmother     No Known Problems Maternal Grandmother     No Known Problems Maternal Grandfather     No Known Problems Brother     No Known Problems Sister     No Known Problems Other     Breast Cancer Neg Hx     Colon Cancer Neg Hx     Eclampsia Neg Hx     Ovarian Cancer Neg Hx      Labor Neg Hx     Spont Abortions Neg Hx     Stroke Neg Hx        Review of Systems   Constitutional: Negative. Negative for activity change, appetite change, chills, diaphoresis, fatigue, fever and unexpected weight change. HENT: Negative. Eyes: Negative. Respiratory: Negative. Cardiovascular: Negative. Palpitations: Lower. Gastrointestinal:  Positive for abdominal pain. Negative for abdominal distention, anal bleeding, blood in stool, constipation, diarrhea, nausea, rectal pain and vomiting. Endocrine: Negative.     Genitourinary:  Negative for decreased urine volume, difficulty urinating, dyspareunia, dysuria, enuresis, flank pain, frequency, genital sores, hematuria, menstrual problem, pelvic pain, urgency, vaginal bleeding, vaginal discharge and vaginal pain. Musculoskeletal:  Positive for back pain. Skin: Negative. Allergic/Immunologic: Negative. Neurological: Negative. Hematological: Negative. Psychiatric/Behavioral: Negative. Objective:     Physical Exam  Constitutional:       General: She is not in acute distress. Appearance: Normal appearance. She is well-developed. She is not diaphoretic. HENT:      Head: Normocephalic and atraumatic. Eyes:      Conjunctiva/sclera: Conjunctivae normal.   Cardiovascular:      Rate and Rhythm: Normal rate and regular rhythm. Pulmonary:      Effort: Pulmonary effort is normal. No respiratory distress. Abdominal:      General: There is no distension or abdominal bruit. Palpations: Abdomen is soft. Abdomen is not rigid. There is no shifting dullness, fluid wave, hepatomegaly, splenomegaly, mass or pulsatile mass. Tenderness: There is no abdominal tenderness. There is no guarding or rebound. Negative signs include Lee's sign and McBurney's sign. Hernia: No hernia is present. There is no hernia in the umbilical area, ventral area, left inguinal area or right inguinal area. Genitourinary:     Labia:         Right: No rash, tenderness, lesion or injury. Left: No rash, tenderness, lesion or injury. Urethra: No prolapse, urethral pain, urethral swelling or urethral lesion. Vagina: Normal. No signs of injury and foreign body. No vaginal discharge, erythema, tenderness or bleeding. Cervix: No cervical motion tenderness, discharge, friability, lesion, erythema, cervical bleeding or eversion. Uterus: Normal. Not deviated, not enlarged, not fixed, not tender and no uterine prolapse.        Adnexa: Right adnexa normal and left adnexa normal.        Right: No mass, tenderness or fullness. Left: No mass, tenderness or fullness. Rectum: Normal.      Comments: No abnormal discharge. No cervical or vaginal lesions. Normal external genitalia. Musculoskeletal:         General: No tenderness or deformity. Normal range of motion. Cervical back: Normal range of motion and neck supple. Skin:     General: Skin is warm and dry. Coloration: Skin is not pale. Neurological:      Mental Status: She is alert and oriented to person, place, and time. Motor: No abnormal muscle tone. Coordination: Coordination normal.   Psychiatric:         Behavior: Behavior normal.         Thought Content: Thought content normal.         Judgment: Judgment normal.       Assessment:          Diagnosis Orders   1. Lower abdominal pain  POCT Urinalysis no Micro    US NON OB TRANSVAGINAL    US PELVIS COMPLETE      2. Low back pain, unspecified back pain laterality, unspecified chronicity, unspecified whether sciatica present  POCT Urinalysis no Micro           Plan:      Medications placedthis encounter:  No orders of the defined types were placed in this encounter. Orders placedthis encounter:  Orders Placed This Encounter   Procedures    US NON OB TRANSVAGINAL     Standing Status:   Future     Standing Expiration Date:   10/12/2023    US PELVIS COMPLETE     Standing Status:   Future     Standing Expiration Date:   11/1/2023    POCT Urinalysis no Micro         Follow up:  Return for Results Review, Pelvic US.

## 2022-10-14 ENCOUNTER — HOSPITAL ENCOUNTER (OUTPATIENT)
Dept: ULTRASOUND IMAGING | Age: 62
Discharge: HOME | End: 2022-10-16
Payer: COMMERCIAL

## 2022-10-14 DIAGNOSIS — R10.30 LOWER ABDOMINAL PAIN: ICD-10-CM

## 2022-10-14 PROCEDURE — 76856 US EXAM PELVIC COMPLETE: CPT

## 2022-10-14 PROCEDURE — 76830 TRANSVAGINAL US NON-OB: CPT

## 2022-10-21 ENCOUNTER — OFFICE VISIT (OUTPATIENT)
Dept: OBGYN CLINIC | Age: 62
End: 2022-10-21
Payer: COMMERCIAL

## 2022-10-21 VITALS
HEIGHT: 64 IN | SYSTOLIC BLOOD PRESSURE: 124 MMHG | WEIGHT: 163 LBS | BODY MASS INDEX: 27.83 KG/M2 | DIASTOLIC BLOOD PRESSURE: 72 MMHG

## 2022-10-21 DIAGNOSIS — R10.30 LOWER ABDOMINAL PAIN: Primary | ICD-10-CM

## 2022-10-21 PROCEDURE — G8484 FLU IMMUNIZE NO ADMIN: HCPCS | Performed by: OBSTETRICS & GYNECOLOGY

## 2022-10-21 PROCEDURE — G8419 CALC BMI OUT NRM PARAM NOF/U: HCPCS | Performed by: OBSTETRICS & GYNECOLOGY

## 2022-10-21 PROCEDURE — 1036F TOBACCO NON-USER: CPT | Performed by: OBSTETRICS & GYNECOLOGY

## 2022-10-21 PROCEDURE — 99212 OFFICE O/P EST SF 10 MIN: CPT | Performed by: OBSTETRICS & GYNECOLOGY

## 2022-10-21 PROCEDURE — 3017F COLORECTAL CA SCREEN DOC REV: CPT | Performed by: OBSTETRICS & GYNECOLOGY

## 2022-10-21 PROCEDURE — G8427 DOCREV CUR MEDS BY ELIG CLIN: HCPCS | Performed by: OBSTETRICS & GYNECOLOGY

## 2022-10-21 ASSESSMENT — ENCOUNTER SYMPTOMS
BLOOD IN STOOL: 0
ANAL BLEEDING: 0
DIARRHEA: 0
RECTAL PAIN: 0
CONSTIPATION: 0
ABDOMINAL PAIN: 0
NAUSEA: 0
ALLERGIC/IMMUNOLOGIC NEGATIVE: 1
ABDOMINAL DISTENTION: 0
RESPIRATORY NEGATIVE: 1
VOMITING: 0
EYES NEGATIVE: 1

## 2022-10-21 NOTE — PROGRESS NOTES
Patient here for US results for lower pelvic pain ( see previous note ). Reviewed medical, surgical, social and family history. Also reviewed current medications and allergies. US as follows:  EXAMINATION:   PELVIC ULTRASOUND       10/14/2022       TECHNIQUE:   Transabdominal and transvaginal pelvic ultrasound. A combination of   grayscale technique utilized. COMPARISON:   None used       HISTORY:   ORDERING SYSTEM PROVIDED HISTORY: Lower abdominal pain       FINDINGS:       Measurements:       Uterus: 6.1 cm       Endometrial stripe: 1 mm       Right Ovary:Not seen       Left Ovary: Not seen           Ultrasound Findings:       Uterus: Uterus demonstrates normal myometrial echotexture. Endometrial stripe: Endometrial stripe is within normal limits. Nonvisualization of either ovary secondary to anatomic position and overlying   bowel gas. There are no adnexal masses. Free Fluid: No evidence of free fluid. Impression   1. Normal appearance of the uterus and endometrium on this exam.       2.  Nonvisualization of either ovary. There are no adnexal masses. Discussed negative US. Patient states she ended up taking MOM after leaving office last visit and had a large BM. States she felt better after that time. All questions answered. Pt was seen with total face to face time of 10 minutes with more than 50% of the visit being counseling and education regarding encounter dx of pelvic pain. See discussion /counseling details as stated above.             Vitals:  /72   Ht 5' 4\" (1.626 m)   Wt 163 lb (73.9 kg)   BMI 27.98 kg/m²   Past Medical History:   Diagnosis Date    Breast abscess of female 11/28/2019    Essential hypertension 11/28/2019    IBS (irritable bowel syndrome)     Migraines     PVD (posterior vitreous detachment), right      Past Surgical History:   Procedure Laterality Date    BUNIONECTOMY      EYE SURGERY  11/2018    NASAL SEPTUM SURGERY TUBAL LIGATION      VARICOSE VEIN SURGERY       Allergies: Iodides, Nsaids, Adhesive tape, and Other  Current Outpatient Medications   Medication Sig Dispense Refill    clobetasol (TEMOVATE) 0.05 % ointment Apply 0.5 each topically Twice a Week 30 g 3    ibandronate (BONIVA) 150 MG tablet take 1 tablet by mouth every 30 DAYS take IN THE MORNING WITH A FULL GLASS OF WATER ON AN EMPTY STOMACH AND DO NOT TAKE ANYTHING ELSE BY MOUTH OR LIE DOWN FOR 30 MINUTES 30 tablet 3    cetirizine (ZYRTEC) 10 MG tablet Take 10 mg by mouth daily      rizatriptan (MAXALT) 10 MG tablet       Polyethylene Glycol 3350 (MIRALAX PO)       atenolol (TENORMIN) 50 MG tablet take 1 tablet by mouth once daily  0    magnesium 30 MG tablet Take 30 mg by mouth 2 times daily      Coenzyme Q10 (CO Q-10 MAXIMUM STRENGTH PO) Take by mouth      eletriptan (RELPAX) 40 MG tablet Take 40 mg by mouth once as needed may repeat in 2 hours if necessary      famotidine (PEPCID) 20 MG tablet   1     No current facility-administered medications for this visit.      Social History     Socioeconomic History    Marital status:      Spouse name: Not on file    Number of children: Not on file    Years of education: Not on file    Highest education level: Not on file   Occupational History    Not on file   Tobacco Use    Smoking status: Never    Smokeless tobacco: Never   Substance and Sexual Activity    Alcohol use: No     Alcohol/week: 0.0 standard drinks    Drug use: No    Sexual activity: Yes     Partners: Male   Other Topics Concern    Not on file   Social History Narrative    Not on file     Social Determinants of Health     Financial Resource Strain: Not on file   Food Insecurity: Not on file   Transportation Needs: Not on file   Physical Activity: Not on file   Stress: Not on file   Social Connections: Not on file   Intimate Partner Violence: Not on file   Housing Stability: Not on file        Family History   Problem Relation Age of Onset Alzheimer's Disease Father     Hypertension Mother     Diabetes Mother     Cancer Mother     No Known Problems Paternal Grandfather     No Known Problems Paternal Grandmother     No Known Problems Maternal Grandmother     No Known Problems Maternal Grandfather     No Known Problems Brother     No Known Problems Sister     No Known Problems Other     Breast Cancer Neg Hx     Colon Cancer Neg Hx     Eclampsia Neg Hx     Ovarian Cancer Neg Hx      Labor Neg Hx     Spont Abortions Neg Hx     Stroke Neg Hx        Review of Systems   Constitutional: Negative. Negative for activity change, appetite change, chills, diaphoresis, fatigue, fever and unexpected weight change. HENT: Negative. Eyes: Negative. Respiratory: Negative. Cardiovascular: Negative. Gastrointestinal:  Negative for abdominal distention, abdominal pain, anal bleeding, blood in stool, constipation, diarrhea, nausea, rectal pain and vomiting. Endocrine: Negative. Genitourinary:  Negative for decreased urine volume, difficulty urinating, dyspareunia, dysuria, enuresis, flank pain, frequency, genital sores, hematuria, menstrual problem, pelvic pain, urgency, vaginal bleeding, vaginal discharge and vaginal pain. Musculoskeletal: Negative. Skin: Negative. Allergic/Immunologic: Negative. Neurological: Negative. Hematological: Negative. Psychiatric/Behavioral: Negative. Objective:     Physical Exam  Constitutional:       General: She is not in acute distress. Appearance: She is well-developed. She is not diaphoretic. HENT:      Head: Normocephalic and atraumatic. Eyes:      Conjunctiva/sclera: Conjunctivae normal.   Cardiovascular:      Rate and Rhythm: Normal rate and regular rhythm. Pulmonary:      Effort: Pulmonary effort is normal. No respiratory distress. Musculoskeletal:         General: No tenderness or deformity. Normal range of motion.       Cervical back: Normal range of motion and neck supple. Skin:     General: Skin is warm and dry. Coloration: Skin is not pale. Neurological:      Mental Status: She is alert and oriented to person, place, and time. Motor: No abnormal muscle tone. Coordination: Coordination normal.   Psychiatric:         Behavior: Behavior normal.         Thought Content: Thought content normal.         Judgment: Judgment normal.       Assessment:          Diagnosis Orders   1. Lower abdominal pain             Plan:      Medications placedthis encounter:  No orders of the defined types were placed in this encounter. Orders placedthis encounter:  No orders of the defined types were placed in this encounter.         Follow up:  Return for Annual.

## 2023-06-19 ENCOUNTER — OFFICE VISIT (OUTPATIENT)
Dept: OBGYN CLINIC | Age: 63
End: 2023-06-19
Payer: COMMERCIAL

## 2023-06-19 VITALS
HEIGHT: 64 IN | SYSTOLIC BLOOD PRESSURE: 126 MMHG | DIASTOLIC BLOOD PRESSURE: 84 MMHG | WEIGHT: 164 LBS | BODY MASS INDEX: 28 KG/M2

## 2023-06-19 DIAGNOSIS — Z13.820 OSTEOPOROSIS SCREENING: ICD-10-CM

## 2023-06-19 DIAGNOSIS — Z78.0 POSTMENOPAUSAL: ICD-10-CM

## 2023-06-19 DIAGNOSIS — Z12.31 SCREENING MAMMOGRAM FOR BREAST CANCER: ICD-10-CM

## 2023-06-19 DIAGNOSIS — Z01.419 ENCOUNTER FOR WELL WOMAN EXAM WITH ROUTINE GYNECOLOGICAL EXAM: Primary | ICD-10-CM

## 2023-06-19 PROCEDURE — 3074F SYST BP LT 130 MM HG: CPT | Performed by: OBSTETRICS & GYNECOLOGY

## 2023-06-19 PROCEDURE — 3079F DIAST BP 80-89 MM HG: CPT | Performed by: OBSTETRICS & GYNECOLOGY

## 2023-06-19 PROCEDURE — 99396 PREV VISIT EST AGE 40-64: CPT | Performed by: OBSTETRICS & GYNECOLOGY

## 2023-06-19 SDOH — ECONOMIC STABILITY: INCOME INSECURITY: HOW HARD IS IT FOR YOU TO PAY FOR THE VERY BASICS LIKE FOOD, HOUSING, MEDICAL CARE, AND HEATING?: NOT HARD AT ALL

## 2023-06-19 SDOH — ECONOMIC STABILITY: FOOD INSECURITY: WITHIN THE PAST 12 MONTHS, THE FOOD YOU BOUGHT JUST DIDN'T LAST AND YOU DIDN'T HAVE MONEY TO GET MORE.: NEVER TRUE

## 2023-06-19 SDOH — ECONOMIC STABILITY: HOUSING INSECURITY
IN THE LAST 12 MONTHS, WAS THERE A TIME WHEN YOU DID NOT HAVE A STEADY PLACE TO SLEEP OR SLEPT IN A SHELTER (INCLUDING NOW)?: NO

## 2023-06-19 SDOH — ECONOMIC STABILITY: FOOD INSECURITY: WITHIN THE PAST 12 MONTHS, YOU WORRIED THAT YOUR FOOD WOULD RUN OUT BEFORE YOU GOT MONEY TO BUY MORE.: NEVER TRUE

## 2023-06-19 ASSESSMENT — ENCOUNTER SYMPTOMS
ABDOMINAL PAIN: 0
DIARRHEA: 0
NAUSEA: 0
BLOOD IN STOOL: 0
ANAL BLEEDING: 0
EYES NEGATIVE: 1
RECTAL PAIN: 0
CONSTIPATION: 0
VOMITING: 0
ABDOMINAL DISTENTION: 0
RESPIRATORY NEGATIVE: 1
ALLERGIC/IMMUNOLOGIC NEGATIVE: 1

## 2023-06-19 ASSESSMENT — PATIENT HEALTH QUESTIONNAIRE - PHQ9
1. LITTLE INTEREST OR PLEASURE IN DOING THINGS: 0
SUM OF ALL RESPONSES TO PHQ QUESTIONS 1-9: 0
2. FEELING DOWN, DEPRESSED OR HOPELESS: 0
SUM OF ALL RESPONSES TO PHQ QUESTIONS 1-9: 0
SUM OF ALL RESPONSES TO PHQ9 QUESTIONS 1 & 2: 0

## 2023-06-19 ASSESSMENT — VISUAL ACUITY: OU: 1

## 2023-06-19 NOTE — PROGRESS NOTES
The patient was asked if she would like a chaperone present for her intimate exam. She  Declined the chaperone.  David Husain CMA ([address] [place] [address])

## 2023-06-19 NOTE — PROGRESS NOTES
Subjective:      Patient ID: Estela Rondon is a 58 y.o. female    Annual exam.  Reviewed medical, surgical, social and family history. Also reviewed current medications and allergies. No PMB. No GYN complaints. Pap deferred ( negative co-testing 2020; next pap 2024 at age 71 ). Screening mammogram ordered. Monthly SBE encouraged. Screening colonoscopy recommended per routine. F/U annual exam or prn. Vitals:  /84   Ht 5' 4\" (1.626 m)   Wt 164 lb (74.4 kg)   BMI 28.15 kg/m²   Past Medical History:   Diagnosis Date    Breast abscess of female 11/28/2019    Essential hypertension 11/28/2019    IBS (irritable bowel syndrome)     Migraines     PVD (posterior vitreous detachment), right      Past Surgical History:   Procedure Laterality Date    BUNIONECTOMY      EYE SURGERY  11/2018    KNEE CARTILAGE SURGERY Right 03/2023    NASAL SEPTUM SURGERY      TUBAL LIGATION      VARICOSE VEIN SURGERY       Allergies: Iodides, Nsaids, Adhesive tape, and Other  Current Outpatient Medications   Medication Sig Dispense Refill    ibandronate (BONIVA) 150 MG tablet Take 1 tablet by mouth every 30 days 3 tablet 0    clobetasol (TEMOVATE) 0.05 % ointment Apply 0.5 each topically Twice a Week 30 g 3    cetirizine (ZYRTEC) 10 MG tablet Take 1 tablet by mouth daily      rizatriptan (MAXALT) 10 MG tablet       Polyethylene Glycol 3350 (MIRALAX PO)       atenolol (TENORMIN) 50 MG tablet take 1 tablet by mouth once daily  0    magnesium 30 MG tablet Take 1 tablet by mouth 2 times daily      eletriptan (RELPAX) 40 MG tablet Take 1 tablet by mouth once as needed may repeat in 2 hours if necessary      famotidine (PEPCID) 20 MG tablet   1     No current facility-administered medications for this visit.      Social History     Socioeconomic History    Marital status:      Spouse name: Not on file    Number of children: Not on file    Years of education: Not on file    Highest education level: Not on file

## 2023-06-27 RX ORDER — CLOBETASOL PROPIONATE 0.5 MG/G
0.5 OINTMENT TOPICAL
Qty: 30 G | Refills: 3 | Status: SHIPPED | OUTPATIENT
Start: 2023-06-29

## 2023-08-29 ENCOUNTER — OFFICE VISIT (OUTPATIENT)
Dept: FAMILY MEDICINE CLINIC | Age: 63
End: 2023-08-29
Payer: COMMERCIAL

## 2023-08-29 VITALS
SYSTOLIC BLOOD PRESSURE: 126 MMHG | DIASTOLIC BLOOD PRESSURE: 70 MMHG | BODY MASS INDEX: 26.8 KG/M2 | TEMPERATURE: 97.9 F | RESPIRATION RATE: 12 BRPM | HEART RATE: 75 BPM | OXYGEN SATURATION: 98 % | WEIGHT: 157 LBS | HEIGHT: 64 IN

## 2023-08-29 DIAGNOSIS — T78.40XA ALLERGIC REACTION, INITIAL ENCOUNTER: ICD-10-CM

## 2023-08-29 DIAGNOSIS — T63.441A BEE STING, ACCIDENTAL OR UNINTENTIONAL, INITIAL ENCOUNTER: Primary | ICD-10-CM

## 2023-08-29 PROCEDURE — 3078F DIAST BP <80 MM HG: CPT | Performed by: NURSE PRACTITIONER

## 2023-08-29 PROCEDURE — 3074F SYST BP LT 130 MM HG: CPT | Performed by: NURSE PRACTITIONER

## 2023-08-29 PROCEDURE — 1036F TOBACCO NON-USER: CPT | Performed by: NURSE PRACTITIONER

## 2023-08-29 PROCEDURE — G8427 DOCREV CUR MEDS BY ELIG CLIN: HCPCS | Performed by: NURSE PRACTITIONER

## 2023-08-29 PROCEDURE — G8419 CALC BMI OUT NRM PARAM NOF/U: HCPCS | Performed by: NURSE PRACTITIONER

## 2023-08-29 PROCEDURE — 99213 OFFICE O/P EST LOW 20 MIN: CPT | Performed by: NURSE PRACTITIONER

## 2023-08-29 PROCEDURE — 3017F COLORECTAL CA SCREEN DOC REV: CPT | Performed by: NURSE PRACTITIONER

## 2023-08-29 RX ORDER — PREDNISONE 10 MG/1
TABLET ORAL
Qty: 24 TABLET | Refills: 0 | Status: SHIPPED | OUTPATIENT
Start: 2023-08-29

## 2023-08-29 ASSESSMENT — ENCOUNTER SYMPTOMS
SHORTNESS OF BREATH: 0
CHEST TIGHTNESS: 0
COUGH: 0
STRIDOR: 0
COLOR CHANGE: 1
WHEEZING: 0
FACIAL SWELLING: 1
VOICE CHANGE: 1

## 2023-10-12 ENCOUNTER — HOSPITAL ENCOUNTER (OUTPATIENT)
Dept: WOMENS IMAGING | Age: 63
Discharge: HOME | End: 2023-10-14
Payer: COMMERCIAL

## 2023-10-12 DIAGNOSIS — Z78.0 POSTMENOPAUSAL: ICD-10-CM

## 2023-10-12 DIAGNOSIS — Z13.820 OSTEOPOROSIS SCREENING: ICD-10-CM

## 2023-10-12 PROCEDURE — 77080 DXA BONE DENSITY AXIAL: CPT

## 2023-10-19 ENCOUNTER — OFFICE VISIT (OUTPATIENT)
Dept: OBGYN CLINIC | Age: 63
End: 2023-10-19
Payer: COMMERCIAL

## 2023-10-19 VITALS
SYSTOLIC BLOOD PRESSURE: 122 MMHG | WEIGHT: 163 LBS | BODY MASS INDEX: 27.83 KG/M2 | HEIGHT: 64 IN | DIASTOLIC BLOOD PRESSURE: 74 MMHG

## 2023-10-19 DIAGNOSIS — M81.0 OSTEOPOROSIS, UNSPECIFIED OSTEOPOROSIS TYPE, UNSPECIFIED PATHOLOGICAL FRACTURE PRESENCE: Primary | ICD-10-CM

## 2023-10-19 PROCEDURE — 3017F COLORECTAL CA SCREEN DOC REV: CPT | Performed by: OBSTETRICS & GYNECOLOGY

## 2023-10-19 PROCEDURE — G8427 DOCREV CUR MEDS BY ELIG CLIN: HCPCS | Performed by: OBSTETRICS & GYNECOLOGY

## 2023-10-19 PROCEDURE — 1036F TOBACCO NON-USER: CPT | Performed by: OBSTETRICS & GYNECOLOGY

## 2023-10-19 PROCEDURE — 99212 OFFICE O/P EST SF 10 MIN: CPT | Performed by: OBSTETRICS & GYNECOLOGY

## 2023-10-19 PROCEDURE — G8419 CALC BMI OUT NRM PARAM NOF/U: HCPCS | Performed by: OBSTETRICS & GYNECOLOGY

## 2023-10-19 PROCEDURE — 3078F DIAST BP <80 MM HG: CPT | Performed by: OBSTETRICS & GYNECOLOGY

## 2023-10-19 PROCEDURE — 3074F SYST BP LT 130 MM HG: CPT | Performed by: OBSTETRICS & GYNECOLOGY

## 2023-10-19 PROCEDURE — G8484 FLU IMMUNIZE NO ADMIN: HCPCS | Performed by: OBSTETRICS & GYNECOLOGY

## 2023-10-19 RX ORDER — IBANDRONATE SODIUM 150 MG/1
150 TABLET, FILM COATED ORAL
Qty: 3 TABLET | Refills: 3 | Status: SHIPPED | OUTPATIENT
Start: 2023-10-19

## 2023-10-19 RX ORDER — CLOBETASOL PROPIONATE 0.5 MG/G
0.5 OINTMENT TOPICAL
Qty: 30 G | Refills: 3 | Status: SHIPPED | OUTPATIENT
Start: 2023-10-19

## 2023-10-19 ASSESSMENT — ENCOUNTER SYMPTOMS
VOMITING: 0
NAUSEA: 0
CONSTIPATION: 0
BLOOD IN STOOL: 0
ALLERGIC/IMMUNOLOGIC NEGATIVE: 1
ABDOMINAL PAIN: 0
RECTAL PAIN: 0
DIARRHEA: 0
ANAL BLEEDING: 0
RESPIRATORY NEGATIVE: 1
ABDOMINAL DISTENTION: 0
EYES NEGATIVE: 1

## 2023-10-19 NOTE — PROGRESS NOTES
encounter. Orders placedthis encounter:  No orders of the defined types were placed in this encounter.         Follow up:  Return for Annual.

## 2024-06-20 ENCOUNTER — OFFICE VISIT (OUTPATIENT)
Dept: OBGYN CLINIC | Age: 64
End: 2024-06-20
Payer: MEDICARE

## 2024-06-20 VITALS
DIASTOLIC BLOOD PRESSURE: 80 MMHG | HEIGHT: 64 IN | WEIGHT: 162 LBS | BODY MASS INDEX: 27.66 KG/M2 | SYSTOLIC BLOOD PRESSURE: 112 MMHG

## 2024-06-20 DIAGNOSIS — Z12.31 SCREENING MAMMOGRAM FOR BREAST CANCER: ICD-10-CM

## 2024-06-20 DIAGNOSIS — N81.4 UTERINE PROLAPSE: ICD-10-CM

## 2024-06-20 DIAGNOSIS — Z12.4 CERVICAL CANCER SCREENING: ICD-10-CM

## 2024-06-20 DIAGNOSIS — Z11.51 SCREENING FOR HUMAN PAPILLOMAVIRUS: ICD-10-CM

## 2024-06-20 DIAGNOSIS — N81.11 CYSTOCELE, MIDLINE: ICD-10-CM

## 2024-06-20 DIAGNOSIS — Z01.419 ENCOUNTER FOR WELL WOMAN EXAM WITH ROUTINE GYNECOLOGICAL EXAM: ICD-10-CM

## 2024-06-20 DIAGNOSIS — Z01.419 ENCOUNTER FOR WELL WOMAN EXAM WITH ROUTINE GYNECOLOGICAL EXAM: Primary | ICD-10-CM

## 2024-06-20 PROCEDURE — 3074F SYST BP LT 130 MM HG: CPT | Performed by: OBSTETRICS & GYNECOLOGY

## 2024-06-20 PROCEDURE — G0101 CA SCREEN;PELVIC/BREAST EXAM: HCPCS | Performed by: OBSTETRICS & GYNECOLOGY

## 2024-06-20 PROCEDURE — 99213 OFFICE O/P EST LOW 20 MIN: CPT | Performed by: OBSTETRICS & GYNECOLOGY

## 2024-06-20 PROCEDURE — 3079F DIAST BP 80-89 MM HG: CPT | Performed by: OBSTETRICS & GYNECOLOGY

## 2024-06-20 SDOH — ECONOMIC STABILITY: INCOME INSECURITY: HOW HARD IS IT FOR YOU TO PAY FOR THE VERY BASICS LIKE FOOD, HOUSING, MEDICAL CARE, AND HEATING?: NOT HARD AT ALL

## 2024-06-20 SDOH — ECONOMIC STABILITY: FOOD INSECURITY: WITHIN THE PAST 12 MONTHS, YOU WORRIED THAT YOUR FOOD WOULD RUN OUT BEFORE YOU GOT MONEY TO BUY MORE.: NEVER TRUE

## 2024-06-20 SDOH — ECONOMIC STABILITY: FOOD INSECURITY: WITHIN THE PAST 12 MONTHS, THE FOOD YOU BOUGHT JUST DIDN'T LAST AND YOU DIDN'T HAVE MONEY TO GET MORE.: NEVER TRUE

## 2024-06-20 ASSESSMENT — PATIENT HEALTH QUESTIONNAIRE - PHQ9
2. FEELING DOWN, DEPRESSED OR HOPELESS: NOT AT ALL
SUM OF ALL RESPONSES TO PHQ9 QUESTIONS 1 & 2: 0
SUM OF ALL RESPONSES TO PHQ QUESTIONS 1-9: 0
1. LITTLE INTEREST OR PLEASURE IN DOING THINGS: NOT AT ALL
SUM OF ALL RESPONSES TO PHQ QUESTIONS 1-9: 0

## 2024-06-20 ASSESSMENT — ENCOUNTER SYMPTOMS
NAUSEA: 0
RESPIRATORY NEGATIVE: 1
RECTAL PAIN: 0
DIARRHEA: 0
BLOOD IN STOOL: 0
ABDOMINAL PAIN: 0
ALLERGIC/IMMUNOLOGIC NEGATIVE: 1
CONSTIPATION: 0
ANAL BLEEDING: 0
ABDOMINAL DISTENTION: 0
VOMITING: 0
EYES NEGATIVE: 1

## 2024-06-20 ASSESSMENT — VISUAL ACUITY: OU: 1

## 2024-06-20 NOTE — PROGRESS NOTES
The patient was asked if she would like a chaperone present for her intimate exam. She  Declined the chaperone. Edward Metz CMA (AAMA)    
Rate and Rhythm: Normal rate and regular rhythm.      Heart sounds: Normal heart sounds.   Pulmonary:      Effort: Pulmonary effort is normal. No respiratory distress.      Breath sounds: Normal breath sounds. No wheezing or rales.   Chest:      Chest wall: No tenderness.   Breasts:     Right: Normal. No swelling, bleeding, inverted nipple, mass, nipple discharge, skin change or tenderness.      Left: Normal. No swelling, bleeding, inverted nipple, mass, nipple discharge, skin change or tenderness.   Abdominal:      General: There is no distension.      Palpations: Abdomen is soft. There is no mass.      Tenderness: There is no abdominal tenderness. There is no guarding or rebound.      Hernia: No hernia is present. There is no hernia in the left inguinal area or right inguinal area.   Genitourinary:     General: Normal vulva.      Pubic Area: No rash.       Labia:         Right: No rash, tenderness, lesion or injury.         Left: No rash, tenderness, lesion or injury.       Urethra: No prolapse, urethral swelling or urethral lesion.      Vagina: No signs of injury and foreign body. Prolapsed vaginal walls present. No vaginal discharge, erythema, tenderness or bleeding.      Cervix: No cervical motion tenderness, discharge or friability.      Uterus: With uterine prolapse. Not deviated, not enlarged, not fixed and not tender.       Adnexa:         Right: No mass, tenderness or fullness.          Left: No mass, tenderness or fullness.        Rectum: Normal.   Musculoskeletal:         General: No tenderness. Normal range of motion.      Cervical back: Normal range of motion and neck supple.   Lymphadenopathy:      Cervical: No cervical adenopathy.      Upper Body:      Right upper body: No supraclavicular or axillary adenopathy.      Left upper body: No supraclavicular or axillary adenopathy.      Lower Body: No right inguinal adenopathy. No left inguinal adenopathy.   Skin:     General: Skin is warm and dry.

## 2024-06-25 ENCOUNTER — TELEPHONE (OUTPATIENT)
Dept: OBGYN CLINIC | Age: 64
End: 2024-06-25

## 2024-06-25 LAB
REASON FOR REJECTION: NORMAL
REJECTED TEST: NORMAL

## 2024-06-25 NOTE — TELEPHONE ENCOUNTER
Received a call from [facility] stating there is a specimen rejection for the hpv due to not enough specimen received,will rabia new collection.

## 2025-01-20 RX ORDER — CLOBETASOL PROPIONATE 0.5 MG/G
0.5 OINTMENT TOPICAL
Qty: 30 G | Refills: 3 | Status: SHIPPED | OUTPATIENT
Start: 2025-01-20

## 2025-07-09 ENCOUNTER — OFFICE VISIT (OUTPATIENT)
Dept: OBGYN CLINIC | Age: 65
End: 2025-07-09
Payer: MEDICARE

## 2025-07-09 VITALS
DIASTOLIC BLOOD PRESSURE: 76 MMHG | SYSTOLIC BLOOD PRESSURE: 116 MMHG | HEART RATE: 76 BPM | HEIGHT: 64 IN | BODY MASS INDEX: 29.02 KG/M2 | WEIGHT: 170 LBS

## 2025-07-09 DIAGNOSIS — Z01.419 ENCOUNTER FOR WELL WOMAN EXAM WITH ROUTINE GYNECOLOGICAL EXAM: Primary | ICD-10-CM

## 2025-07-09 DIAGNOSIS — Z12.31 SCREENING MAMMOGRAM FOR BREAST CANCER: ICD-10-CM

## 2025-07-09 DIAGNOSIS — Z13.820 SCREENING FOR OSTEOPOROSIS: ICD-10-CM

## 2025-07-09 PROCEDURE — 1159F MED LIST DOCD IN RCRD: CPT | Performed by: OBSTETRICS & GYNECOLOGY

## 2025-07-09 PROCEDURE — 3078F DIAST BP <80 MM HG: CPT | Performed by: OBSTETRICS & GYNECOLOGY

## 2025-07-09 PROCEDURE — 3074F SYST BP LT 130 MM HG: CPT | Performed by: OBSTETRICS & GYNECOLOGY

## 2025-07-09 PROCEDURE — 99397 PER PM REEVAL EST PAT 65+ YR: CPT | Performed by: OBSTETRICS & GYNECOLOGY

## 2025-07-09 RX ORDER — CLOBETASOL PROPIONATE 0.5 MG/G
OINTMENT TOPICAL
Qty: 60 G | Refills: 5 | Status: SHIPPED | OUTPATIENT
Start: 2025-07-09

## 2025-07-09 SDOH — ECONOMIC STABILITY: FOOD INSECURITY: WITHIN THE PAST 12 MONTHS, YOU WORRIED THAT YOUR FOOD WOULD RUN OUT BEFORE YOU GOT MONEY TO BUY MORE.: NEVER TRUE

## 2025-07-09 SDOH — ECONOMIC STABILITY: FOOD INSECURITY: WITHIN THE PAST 12 MONTHS, THE FOOD YOU BOUGHT JUST DIDN'T LAST AND YOU DIDN'T HAVE MONEY TO GET MORE.: NEVER TRUE

## 2025-07-09 ASSESSMENT — PATIENT HEALTH QUESTIONNAIRE - PHQ9
1. LITTLE INTEREST OR PLEASURE IN DOING THINGS: NOT AT ALL
SUM OF ALL RESPONSES TO PHQ QUESTIONS 1-9: 0
2. FEELING DOWN, DEPRESSED OR HOPELESS: NOT AT ALL
SUM OF ALL RESPONSES TO PHQ QUESTIONS 1-9: 0

## 2025-07-09 NOTE — PROGRESS NOTES
SUBJECTIVE:   65 y.o.  female here for annual exam. Pt . Pt with h/o lichen sclerosis and pt needs refill of meds. Pt also with h/o osteoporosis and pt was on Boniva but pt had to stop medication for jaw surgery. Pt not currently on any medications for osteoporosis.     Review of Systems:  General ROS: negative  Psychological ROS: negative  ENT ROS: negative  Endocrine ROS: negative  Respiratory ROS: no cough, shortness of breath, or wheezing  Cardiovascular ROS: no chest pain or dyspnea on exertion  Gastrointestinal ROS: no abdominal pain, change in bowel habits, or black or bloody stools  Genito-Urinary ROS: no dysuria, trouble voiding, or hematuria  Musculoskeletal ROS: negative  Neurological ROS: no TIA or stroke symptoms  Dermatological ROS: negative    OBJECTIVE:   /76   Pulse 76   Ht 1.626 m (5' 4\")   Wt 77.1 kg (170 lb)   BMI 29.18 kg/m²     Physical Exam:  CONSTITUTIONAL: She appears well nourished and developed   NEUROLOGIC: Alert and oriented to time, place and person  NECK: no thyroidmegaly  BREASTS: Bilateral breasts without masses, lesions or nipple discharge  LUNGS: Clear to ascultation bilaterally  CVS: regular rate and rhythm  LYMPHATIC: No palpable lymph nodes  ABDOMEN: benign, soft, nontender, no masses. No liver or splenic organomegaly. No evidence of abdominal or inguinal hernia. No indication for occult blood testing  SKIN: normal texture and tone, no lesions  NEURO: normal tone, no hyperreflexia, 1+DTRs throughout    Pelvic Exam:   EFG: normal external genitalia  URETHRAL MEATUS: normal size, no diverticula   URETHRA: normal appearing without diverticula or lesions  BLADDER:  No masses or tenderness  VAGINA: normal rugae, no discharge, mild to moderate cystocele   CERVIX: parous, no lesions, sessile cervical polyp, not friable  UTERUS: uterus is normal size, shape, consistency and nontender   ADNEXA: normal adnexa in size, nontender and no masses.  PERINEUM: normal